# Patient Record
Sex: MALE | Race: WHITE
[De-identification: names, ages, dates, MRNs, and addresses within clinical notes are randomized per-mention and may not be internally consistent; named-entity substitution may affect disease eponyms.]

---

## 2019-09-05 LAB
BUN BLD-MCNC: 35 MG/DL (ref 7–18)
GLUCOSE SERPLBLD-MCNC: 96 MG/DL (ref 74–106)
HCT VFR BLD CALC: 33.7 % (ref 39.6–49)
INR BLD: 1.06
LYMPHOCYTES # SPEC AUTO: 1.2 K/UL (ref 0.7–4.9)
PMV BLD: 7.7 FL (ref 7.6–11.3)
POTASSIUM SERPL-SCNC: 4.8 MMOL/L (ref 3.5–5.1)
RBC # BLD: 3.64 M/UL (ref 4.33–5.43)

## 2019-09-05 NOTE — RAD REPORT
EXAM DESCRIPTION:  RAD - Chest Pa And Lat (2 Views) - 9/5/2019 10:57 am

 

CLINICAL HISTORY:  preop

Chest pain.

 

COMPARISON:  No comparisons

 

FINDINGS:  The lungs are emphysematous but clear. The heart is normal in size. No displaced fractures
.

 

IMPRESSION:  Mild COPD.

## 2019-09-11 ENCOUNTER — HOSPITAL ENCOUNTER (OUTPATIENT)
Dept: HOSPITAL 97 - OR | Age: 64
Discharge: HOME | End: 2019-09-11
Attending: ORTHOPAEDIC SURGERY
Payer: COMMERCIAL

## 2019-09-11 VITALS — SYSTOLIC BLOOD PRESSURE: 105 MMHG | DIASTOLIC BLOOD PRESSURE: 80 MMHG | OXYGEN SATURATION: 94 % | TEMPERATURE: 97 F

## 2019-09-11 DIAGNOSIS — G47.33: ICD-10-CM

## 2019-09-11 DIAGNOSIS — M75.51: ICD-10-CM

## 2019-09-11 DIAGNOSIS — S43.431A: ICD-10-CM

## 2019-09-11 DIAGNOSIS — I10: ICD-10-CM

## 2019-09-11 DIAGNOSIS — M75.21: ICD-10-CM

## 2019-09-11 DIAGNOSIS — Z82.49: ICD-10-CM

## 2019-09-11 DIAGNOSIS — M75.41: ICD-10-CM

## 2019-09-11 DIAGNOSIS — M54.12: ICD-10-CM

## 2019-09-11 DIAGNOSIS — E78.00: ICD-10-CM

## 2019-09-11 DIAGNOSIS — K21.9: ICD-10-CM

## 2019-09-11 DIAGNOSIS — M75.101: Primary | ICD-10-CM

## 2019-09-11 PROCEDURE — 71046 X-RAY EXAM CHEST 2 VIEWS: CPT

## 2019-09-11 PROCEDURE — 85610 PROTHROMBIN TIME: CPT

## 2019-09-11 PROCEDURE — 0RBJ4ZZ EXCISION OF RIGHT SHOULDER JOINT, PERCUTANEOUS ENDOSCOPIC APPROACH: ICD-10-PCS

## 2019-09-11 PROCEDURE — 85025 COMPLETE CBC W/AUTO DIFF WBC: CPT

## 2019-09-11 PROCEDURE — 73020 X-RAY EXAM OF SHOULDER: CPT

## 2019-09-11 PROCEDURE — 36415 COLL VENOUS BLD VENIPUNCTURE: CPT

## 2019-09-11 PROCEDURE — 0LS10ZZ REPOSITION RIGHT SHOULDER TENDON, OPEN APPROACH: ICD-10-PCS

## 2019-09-11 PROCEDURE — 0RHJ04Z INSERTION OF INTERNAL FIXATION DEVICE INTO RIGHT SHOULDER JOINT, OPEN APPROACH: ICD-10-PCS

## 2019-09-11 PROCEDURE — 0LQ14ZZ REPAIR RIGHT SHOULDER TENDON, PERCUTANEOUS ENDOSCOPIC APPROACH: ICD-10-PCS

## 2019-09-11 PROCEDURE — 85730 THROMBOPLASTIN TIME PARTIAL: CPT

## 2019-09-11 PROCEDURE — 80048 BASIC METABOLIC PNL TOTAL CA: CPT

## 2019-09-11 RX ADMIN — HYDROMORPHONE HYDROCHLORIDE ONE MG: 2 INJECTION INTRAMUSCULAR; INTRAVENOUS; SUBCUTANEOUS at 10:22

## 2019-09-11 RX ADMIN — HYDROMORPHONE HYDROCHLORIDE ONE MG: 2 INJECTION INTRAMUSCULAR; INTRAVENOUS; SUBCUTANEOUS at 10:06

## 2019-09-11 NOTE — XMS REPORT
Summary of Care

 Created on:2019



Patient:Shekhar Griffin

Sex:Male

:1955

External Reference #:[id number]





Demographics







 Address  [address] [place] [address]

 

 Mobile Phone  [phone]

 

 Home Phone  [phone]

 

 Phone  [phone]

 

 Home Phone  [phone]

 

 Email Address  [email or web address]

 

 Preferred Language  English

 

 Marital Status  

 

 Anglican Affiliation  Unknown

 

 Race  White

 

 Ethnic Group  Not  or 









Author







 Organization  [facility]

 

 Address  [address] [place] [address]









Support







 Name  Relationship  Address  Phone

 

 Gaby Mehta  Unavailable  [address] [facility]  [phone]



     [place] [address]  









Care Team Providers







 Name  Role  Phone

 

 Clark Daley  Primary Care Provider  Unavailable









Reason for Referral

 (Routine)





 Status  Reason  Specialty  Diagnoses /  Referred By  Referred To



       Procedures  Contact  Contact

 

 New Request    Sleep Disorder  Diagnoses



Obstructive sleep apnea of adult  [facility],  



     Diagnostic  



Procedures



SLEEP STUDY, ATTENDED  [address]  



         [address]



  



         [place] [address]



  



         Phone:  



         [phone]



  



         Fax:  



         [phone]  









Reason for Visit







 Reason  Comments

 

 New Patient  

 

 Results  HST



 (Routine)





 Status  Reason  Specialty  Diagnoses /  Referred By  Referred To



       Procedures  Contact  Contact

 

 Closed    Sleep Disorder  Diagnoses



STEVEN (obstructive sleep apnea)  Bobbi Orr MD



  



     Diagnostic  



Procedures



CONSULT/REFERRAL SLEEP CLINIC ADULT PULM Preferred Location: [address]



  



         [address]



  



         [place]  



         [address]



  



         Phone:  



         [phone]



  



         Fax: [phone]  









Encounter Details







 Date  Type  Department  Care Team  Description

 

 2019  Office Visit  [facility]  Bekah Dennis 



  Obstructive sleep



     Pulmonary Clinic



  [address] [place] [address] 



  apnea of [address]



  (Primary Dx)



     [address]



  [place] [address]



  



     [place]  [phone]



  



     [phone] (Fax)  



     [phone]    







Allergies

No Known Allergiesdocumented as of this encounter (statuses as of 2019)



Medications







 Medication  Sig  Dispensed  Refills  Start Date  End Date  Status

 

 folic  Take  by mouth    0      Active



 acid/multivit-min/lutein  daily.          



 (CENTRUM SILVER ORAL)            

 

 acetaminophen/diphenhydr  Take  by mouth    0      Active



 amine (TYLENOL PM ORAL)  as needed.          

 

 Bifidobacterium infantis  Take  by mouth    0      Active



 (ALIGN ORAL)  daily.          

 

 vit A/vit C/vit  Take  by mouth    0      Active



 E/zinc/copper  daily.          



 (PRESERVISION AREDS            



 ORAL)            

 

 simvastatin 40 mg tablet  Take 40 mg by    0      Active



   mouth at          



   bedtime.          

 

 sucralfate 1 gram tablet  Take 3 g by    0      Active



   mouth before          



   meals and at          



   bedtime.          

 

 BENZONATATE ORAL  Take  by mouth    0      Active



   3 (three) times          



   daily.          

 

 triamterene-hydrochlorot  Take 1 capsule  30 capsule  2  2019    Active



 hiazide (DYAZIDE)  by mouth every          



 37.5-25 mg per capsule  morning.          

 

 amLODIPine 10 mg tablet  Take 1 tablet    0  2019    Active



   by mouth every          



   morning.          

 

 lisinopril 40 mg  Take 1 tablet  30 tablet  2  2019    Active



 tabletIndications:  by mouth every          



 Essential hypertension  morning.          



documented as of this encounter (statuses as of 2019)



Active Problems

Not on filedocumented as of this encounter (statuses as of 2019)



Social History







 Tobacco Use  Types  Packs/Day  Years Used  Date

 

 Former Smoker        









 Smokeless Tobacco: Former User      









 Alcohol Use  Drinks/Week  oz/Week  Comments

 

 Yes      social









 Sex Assigned at Birth  Date Recorded

 

 Not on file  









 Job Start Date  Occupation  Industry

 

 Not on file  Not on file  Not on file









 Travel History  Travel Start  Travel End









 No recent travel history available.



documented as of this encounter



Last Filed Vital Signs







 Vital Sign  Reading  Time Taken  Comments

 

 Blood Pressure  117/76  2019 11:26 AM CDT  

 

 Pulse  90  2019 11:26 AM CDT  

 

 Temperature  -  -  

 

 Respiratory Rate  19  2019 11:26 AM CDT  

 

 Oxygen Saturation  97%  2019 11:26 AM CDT  

 

 Inhaled Oxygen Concentration  -  -  

 

 Weight  107 kg (235 lb 12.8 oz)  2019 11:26 AM CDT  

 

 Height  193 cm (6' 4")  2019 11:26 AM CDT  

 

 Body Mass Index  28.7  2019 11:26 AM CDT  



documented in this encounter



Progress Notes

Bekah Dennis - 2019 11:30 AM CDTReason for Clinic Visit: The 
patient comes to clinic today for a review of sleep study results.



Chief Complaints: The patient reports: excessive daytime sleepiness, fatigue, 
snoring, witnessed apneas.



History of Present Illness: The usual bed time is 9 p.m. and wake up time is 5:
30-6 a.m. The patientdoes take intentional naps during the day. The patient 
does not suffer from irresistible sleep attacks during the day. The patient 
does not experience sudden loss of muscle tone when emotional or excited. The 
patient does not report vivid dream-like images and loss of muscle tone when 
falling asleep and upon awakening.



Anderson Sleepiness Scale score: 10 (0-24).



Social History:  The patient does not smoke cigarettes. The patient 
occasionally drinks alcoholic beverages. Caffeinated beverages consumption: 1-2 
per day.



Family History: The family history is positive for snoring in blood relatives.



Physical Examination: 1) Vital signs: as noted above. 2) General: the patient 
is pleasant,  well developed, and in NAD. 3) Skin: there are no rashes, edema, 
or abnormal pigmentation. 4) Head: there areno skull deformities or 
pathological facial asymmetry. 5) Eyes: pupils are equal, round, and reactive 
to light; extraocular movements are conjugate and unrestricted, without 
strabismus or nystagmus. 6)ENT: oropharynx reveals low set soft palate and 
elongated uvula; the patient displays a good sniff through both the right and 
left nostril. 7) Neck: there are no distended veins or enlarged lymph nodes. 8) 
Back: straight, spine - without pathological curvatures. 9) Bilateral lower 
extremities are without edema. 10) Neurological - patient is alert, oriented 
and answers questions appropriately.



Review of Systems:

 1)Respiratory: positive for snoring and witnessed apneas; 2)Cardiovascular: 
positive for hypertension; 3)Endocrine/Metabolic: positive for dyslipidemia; 4)
Digestive: negative for abnormalities; 5)Urinary: positive for nocturia; 6)
Skeletal: no skeletal abnormalities detected; 7)Muscular: negative for muscular 
abnormalities; 8)Nervous: positive for hypersomnia; 9)Integumentary: no visible 
or reported skin or hair abnormalities; 10)Reproductive: no reproductive 
abnormalities noted; 11)Immune/Lymphatic/Allergy: positive for respiratory 
allergies.



Sleep Study Results: The HST on 19 revealed respiratory disturbance index 
of 37.8 events per hour of total sleep (normal &lt;5/hr) with a minimum oxygen 
saturation by pulse oximetry of 84%.



Diagnosis: Obstructive Sleep Apnea-G47.33



Recommendations and Patient Education:

The medical condition (Obstructive Sleep Apnea Syndrome) was discussed with the 
patient and the possible consequences of untreated sleep apnea regarding 
quality of sleep, daytime alertness, and cardiovascular complications were 
underlined.

The patient was prompted to ask questions clarifying the subject of sleep apnea 
and related issues and all questions were answered in detail.

Different treatment options were discussed with the patient including Positive 
Airway Pressure (PAP), ENT surgery, Mandibular Advancement Device, and weight 
reduction.

The effectiveness, success rates, and side effects of these different treatment 
options were compared and analyzed.

The patient prefers PAP treatment trial at this time and I have therefore 
scheduled for a PAP titration study.

Additional time was spent discussing sleep hygiene including: regular bedtime 
and wake-up times; enough sleep hours; going to bed only when sleepy; using bed 
for the sole purpose of sleeping; avoidanceof: 1) caffeinated and alcoholic 
beverages, 2) strenuous cognitive activity, or 3) heavy meals in the evening.

The patient was instructed to contact us in case of any further questions or 
concerns.



I will follow up with the patient to review the results of the sleep study.

Electronically signed by Bekah Dennis at 2019 11:51 AM 
CDTdocumented in this encounter



Plan of Treatment







 Date  Type  Specialty  Care Team  Description

 

 2019  Office Visit  Cardiology  Bobbi Orr MD



  



       [address] [place] [address]



  



       [phone] (Fax)  

 

 2019  Technician Visit  Sleep Disorder  Bekah Dennis



[address] [place] [address] [address] [place] [address]



[phone] (Fax)  



     Diagnostic  



1, [facility] Sleep Lab Bed  

 

 2019  Office Visit  Pulmonary Disease  Bekah Dennis



  



       [address] [place] [address] [address] [place] [address]



  



       [phone] (Fax)  









 Name  Type  Priority  Associated Diagnoses  Order Schedule

 

 SLEEP STUDY, ATTENDED  PROCEDURES  Routine  Obstructive sleep apnea  Ordered: 
2019



       of adult  









 Health Maintenance  Due Date  Last Done  Comments

 

 HEPATITIS C (HCV) SCREEN  1955    

 

 DTaP,Tdap,and Td Vaccines (1 -  1975    



 Tdap)      

 

 COLONOSCOPY  2006    

 

 Zoster Recombinant Vaccine  2006    



 (SHINGRIX) (1 of 2)      

 

 LUNG CANCER SCREEN: Recommended  2011    



 for age 55-80 with 30 + pack year      



 history      

 

 INFLUENZA VACCINE  2019    

 

 PNEUMOCOCCAL 0-64 YEARS COMBINED  Aged Out    No longer eligible based on



 SERIES      patient's age to complete this



       topic



documented as of this encounter



Results

Not on filedocumented in this encounter



Visit Diagnoses







 Diagnosis

 

 Obstructive sleep apnea of adult - Primary







 Obstructive sleep apnea (adult) (pediatric)



documented in this encounter



Insurance







 Payer  Benefit Plan  Subscriber ID  Effective Dates  Phone  Address  Type



   / Group          

 

 BCBS [facility]  [id number]  2019-Radha  [phone]  [address]  
[address]



 [place]      t  [address]  [id number]



  



           [place]  



           [address]  









 Guarantor Name  Account Type  Relation to  Date of  Phone  Billing



     Patient  Birth    Address

 

 RodrickShekhar mclaughlin  Personal/Family  Self  1955  [phone]  [address]



         (Home)



  [place]







         [phone]  [facility]



         (Work)  [place] [address]



documented as of this encounter

## 2019-09-11 NOTE — XMS REPORT
Patient Summary Document

 Created on:2019



Patient:ISAURA POP

Sex:Male

:1955

External Reference #:[id number]





Demographics







 Address  [address] [place] [address]

 

 Home Phone  [phone]

 

 Work Phone  [phone]

 

 Email Address  [email or web address]

 

 Preferred Language  Unknown

 

 Marital Status  Unknown

 

 Holiness Affiliation  Unknown

 

 Race  Unknown

 

 Additional Race(s)  Unavailable

 

 Ethnic Group  Unknown









Author







 Organization  [facility]

 

 Address  [address] [place] [address] Dr. Pillai [address] [place] [address]

 

 Phone  [phone]









Care Team Providers







 Name  Role  Phone

 

 Unavailable  Unavailable  Unavailable









Problems

This patient has no known problems.



Allergies, Adverse Reactions, Alerts

This patient has no known allergies or adverse reactions.



Medications

This patient has no known medications.

## 2019-09-11 NOTE — XMS REPORT
Summary of Care

 Created on:2019



Patient:Shekhar Griffin

Sex:Male

:1955

External Reference #:[id number]





Demographics







 Address  [address] [place] [address]

 

 Mobile Phone  [phone]

 

 Home Phone  [phone]

 

 Phone  [phone]

 

 Home Phone  [phone]

 

 Email Address  [email or web address]

 

 Preferred Language  English

 

 Marital Status  

 

 Taoism Affiliation  Unknown

 

 Race  White

 

 Ethnic Group  Not  or 









Author







 Organization  [facility]

 

 Address  [address] [place] [address]









Support







 Name  Relationship  Address  Phone

 

 Gaby Mehta  Unavailable  [address] [facility]  [phone]



     [place] [address]  









Care Team Providers







 Name  Role  Phone

 

 ThuyClark barr  Primary Care Provider  Unavailable









Reason for Visit







 Reason  Comments

 

 Follow-up  3mo

 

 Pre-op Clearance  Rotator Cuff Sx







Encounter Details







 Date  Type  Department  Care Team  Description

 

 2019  Office Visit  [facility]  Bobbi Orr MD



  Chest pain, unspecified type (Primary Dx);



     Cardiology- [address]  Preop cardiovascular exam;



     [address] [place] [address]



  STEVEN (obstructive sleep apnea)



     [address]



  [address]



  



     [place] [address]



  



     [phone] (Fax)  







Allergies

No Known Allergiesdocumented as of this encounter (statuses as of 2019)



Medications







 Medication  Sig  Dispensed  Refills  Start Date  End Date  Status

 

 vit A/vit C/vit  Take  by    0      Active



 E/zinc/copper  mouth daily.          



 (PRESERVISION AREDS            



 ORAL)            

 

 simvastatin 40 mg  Take 40 mg by    0      Active



 tablet  mouth at          



   bedtime.          

 

 triamterene-hydrochlo  Take 1  30 capsule  2  2019    Active



 rothiazide (DYAZIDE)  capsule by          



 37.5-25 mg per  mouth every          



 capsule  morning.          

 

 amLODIPine 10 mg  Take 1 tablet    0  2019    Active



 tablet  by mouth          



   every          



   morning.          

 

 lisinopril 40 mg  Take 1 tablet  30 tablet  2  2019    Active



 tabletIndications:  by mouth          



 Essential  every          



 hypertension  morning.          

 

 folic  Take  by    0      Discontinued



 acid/multivit-min/lut  mouth daily.        9  



 ein (CENTRUM SILVER            



 ORAL)            

 

 acetaminophen/diphenh  Take  by    0      Discontinued



 ydramine (TYLENOL PM  mouth as        9  



 ORAL)  needed.          

 

 Bifidobacterium  Take  by    0      Discontinued



 infantis (ALIGN ORAL)  mouth daily.        9  

 

 sucralfate 1 gram  Take 3 g by    0      Discontinued



 tablet  mouth before        9  



   meals and at          



   bedtime.          

 

 BENZONATATE ORAL  Take  by    0      Discontinued



   mouth 3        9  



   (three) times          



   daily.          



documented as of this encounter (statuses as of 2019)



Active Problems

Not on filedocumented as of this encounter (statuses as of 2019)



Social History







 Tobacco Use  Types  Packs/Day  Years Used  Date

 

 Former Smoker        









 Smokeless Tobacco: Former User      









 Alcohol Use  Drinks/Week  oz/Week  Comments

 

 Yes      social









 Sex Assigned at Birth  Date Recorded

 

 Not on file  









 Job Start Date  Occupation  Industry

 

 Not on file  Not on file  Not on file









 Travel History  Travel Start  Travel End









 No recent travel history available.



documented as of this encounter



Last Filed Vital Signs







 Vital Sign  Reading  Time Taken  Comments

 

 Blood Pressure  108/72  2019 10:33 AM  



     CDT  

 

 Pulse  83  2019 10:33 AM  



     CDT  

 

 Temperature  -  -  

 

 Respiratory Rate  20  2019 10:33 AM  



     CDT  

 

 Oxygen Saturation  97%  2019 10:33 AM  



     CDT  

 

 Inhaled Oxygen Concentration  -  -  

 

 Weight  106.9 kg (235 lb 9.6 oz)  2019 10:33 AM  



     CDT  

 

 Height  190.5 cm (6' 3")  2019 10:33 AM  



     CDT  

 

 Body Mass Index  29.45  2019 10:33 AM  



     CDT  



documented in this encounter



Progress Notes

Bobbi Orr MD - 2019 10:20 AM CDTFormatting of this note might be 
different from the original.

CARDIOLOGY CLINIC NOTE

2019

Reason for Referral/Presenting Complaint: chest pain



PCP: Clark Daley



History of Present Illness:

Shekhar Griffin is a 63 years old male with history of HTN, HLD. He is here 
for chest pain.

It is precordial exploding sensation, associated with SOB. It only occurs when 
he is exerting and burps at the same time. Feels that he cannot get the gas 
out. Sometimes he wakes up burping. Occasionalleft sided arm pain. Has a slimy 
taste with nausea. Barium test normal. Underwent GI endoscopy. BP has been 
high. He snores.

Going to have rotator cuff repair.

Stated that when working outside in hot whether, he gets abdominal pressure, 
then he feels that he cannot burp fast enough to get rid of the gas. It 
accumulates and pushes the chest pain. If he gets inside and drinks something 
cool he will then burp which will relieve the chest pressure.



Review of Systems:

General: (-) fever, (-) chills, (-) weight change, (-) dizziness, (-) fatigue

Skin: (-) rash

HEENT: (-) headache, (-) change in vision

Neck: (-) difficulty swallowing

Heme: negative

Resp: (-) cough, (-) dyspnea on exertion

Cardio: (+) chest pain, (-) palpitations, (-) syncope

GI: (-) vomiting, (-) diarrhea

: negative

Endo: (-) diabetes, (-) thyroid disease

Neuro: (-) numbness, (-) tingling, (-) weakness

Back: (-) pain

ZANDER: (-) muscle pain, (-) claudication

Psych: (-) anxiety, (-) depression



Past Medical History:

No past medical history on file.

Current Medications:

Current Outpatient Medications

Medication Sig Dispense Refill

 lisinopril 40 mg tablet Take 1 tablet by mouth every morning. 30 tablet 2

 amLODIPine 10 mg tablet Take 1 tablet by mouth every morning.

 triamterene-hydrochlorothiazide (DYAZIDE) 37.5-25 mg per capsule Take 1 
capsule by mouth every morning. 30 capsule 2

 simvastatin 40 mg tablet Take 40 mg by mouth at bedtime.

 vit A/vit C/vit E/zinc/copper (PRESERVISION AREDS ORAL) Take  by mouth 
daily.



No current facility-administered medications for this visit.



Social History:

Social History



Socioeconomic History

 Marital status: 

  Spouse name: Not on file

 Number of children: Not on file

 Years of education: Not on file

 Highest education level: Not on file

Occupational History

 Not on file

Social Needs

 Financial resource strain: Not on file

 Food insecurity:

  Worry: Not on file

  Inability: Not on file

 Transportation needs:

  Medical: Not on file

  Non-medical: Not on file

Tobacco Use

 Smoking status: Former Smoker

 Smokeless tobacco: Former User

Substance and Sexual Activity

 Alcohol use: Yes

  Comment: social

 Drug use: No

 Sexual activity: Not on file

Lifestyle

 Physical activity:

  Days per week: Not on file

  Minutes per session: Not on file

 Stress: Not on file

Relationships

 Social connections:

  Talks on phone: Not on file

  Gets together: Not on file

  Attends Protestant service: Not on file

  Active member of club or organization: Not on file

  Attends meetings of clubs or organizations: Not on file

  Relationship status: Not on file

 Intimate partner violence:

  Fear of current or ex partner: Not on file

  Emotionally abused: Not on file

  Physically abused: Not on file

  Forced sexual activity: Not on file

Other Topics Concern

 Not on file

Social History Narrative

 Not on file



Family History

Family History

Problem Relation Age of Onset

 CHF (congestive heart failure) Father

 MI (myocardial infarction) Father 68

      of MI

 Heart Mother

     pacemaker



Physical Examination:

/72 (BP Location: Left arm, Patient Position: Sitting, BP CUFF SIZE: 
Adult Large)  | Pulse 83| Resp 20  | Ht 6' 3" (1.905 m)  | Wt 235 lb 9.6 oz (
106.9 kg)  | SpO2 97%  | BMI 29.45 kg/m

Constitutional: alert and oriented x 3 (person, place and date/time); no 
apparent distress

ENT: normocephalic atraumatic, supple, no lymphadenopathy, no bruits, no JVD

Lungs: clear to auscultation bilaterally

Cardiovascular: S1, S2 normal, regular; no murmurs, rubs or gallops

GI: soft; non-tender; non-distended; normoactive bowel sounds

: not examined

Musculoskeletal: Extremities: no clubbing, cyanosis, or edema

Skin: no rashes

Neuro: no focal deficits



Cardiovascular testing:

EKG: Sinus bradycardia, HR 54 bpm, minimal LVH



Assessment/Plan:

  ICD-10-CM ICD-9-CM

1. Chest pain, unspecified type R07.9 786.50

2. Preop cardiovascular exam Z01.810 V72.81



Chest pain--Mostly dyspepsia. Nuclear stress test showed no major concerns, 
possibly some artifact without clearcut ischemia. However we are open to 
getting a definitive diagnosis with [facility] if necessary. Low to intermediate 
cardiac risk for rotator cuff repair.

HTN--Will continue amlodipine and lisinopril. Low salt diet. Daily BP log. BP 
control is good now.

STEVEN--severe STEVEN. Referred to sleep clinic.

Patient was counseled for lifestyle modifications including: diet, exercise and 
weight loss



Bobbi Orr MD, FACC, L[place]NO[place]

, Division of Cardiology

[facility]



Electronically signed by Bobbi Orr MD at 2019 11:43 AM CDTdocumented 
in this encounter



Plan of Treatment







 Date  Type  Specialty  Care Team  Description

 

 2019  Office Visit  Pulmonary Disease  Bekah Dennis T



  



       [address] [place] [address] [address]



  



       [address]



  



       [place], [place] [address] [phone] (Fax)  

 

 2020  Office Visit  Cardiology  Bobbi Orr MD



  



       [address] [facility]



  



       [address]



  



       [place] [address] [phone] (Fax)  









 Health Maintenance  Due Date  Last Done  Comments

 

 HEPATITIS C (HCV) SCREEN  1956    

 

 DTaP,Tdap,and Td Vaccines (1 -  1975    



 Tdap)      

 

 COLONOSCOPY  2006    

 

 Zoster Recombinant Vaccine  2006    



 (SHINGRIX) (1 of 2)      

 

 LUNG CANCER SCREEN: Recommended  2011    



 for age 55-80 with 30 + pack year      



 history      

 

 INFLUENZA VACCINE (#1)  2019    

 

 PNEUMOCOCCAL 0-64 YEARS COMBINED  Aged Out    No longer eligible based on



 SERIES      patient's age to complete this



       topic



documented as of this encounter



Results

Not on filedocumented in this encounter



Visit Diagnoses







 Diagnosis

 

 Chest pain, unspecified type - Primary

 

 Preop cardiovascular exam







 Pre-operative cardiovascular examination

 

 STEVEN (obstructive sleep apnea)







 Obstructive sleep apnea (adult) (pediatric)



documented in this encounter



Insurance







 Payer  Benefit Plan  Subscriber ID  Effective Dates  Phone  Address  Type



   / Group          

 

 [facility]  [id number]  2019-Radha  [phone]  [address]  
[address]



 [place]      t  [address]  [id number]



  



           [place], [place]  



           [address]  









 Guarantor Name  Account Type  Relation to  Date of  Phone  Billing



     Patient  Birth    Address

 

 Shekhar Griffin  Personal/Family  Self  1955  [phone]  [address]



         (Home)



  [place]







         [phone]  [place],



         ([place])  [place] [address]



documented as of this encounter

## 2019-09-11 NOTE — XMS REPORT
Summary of Care

 Created on:2019



Patient:Shekhar Griffin

Sex:Male

:1955

External Reference #:[id number]





Demographics







 Address  [address] [place] [address]

 

 Mobile Phone  [phone]

 

 Home Phone  [phone]

 

 Phone  [phone]

 

 Home Phone  [phone]

 

 Email Address  [email or web address]

 

 Preferred Language  English

 

 Marital Status  

 

 Scientologist Affiliation  Unknown

 

 Race  White

 

 Ethnic Group  Not  or 









Author







 Organization  [facility]

 

 Address  [address] [place] [address]









Support







 Name  Relationship  Address  Phone

 

 Gaby Mehta  Unavailable  [address] [facility]  [phone]



     [place] [address]  









Care Team Providers







 Name  Role  Phone

 

 Clark Daley  Primary Care Provider  Unavailable









Encounter Details







 Date  Type  Department  Care Team  Description

 

 2019  Orders Only  [facility]



  Doctor Unassigned, No  



     [address] [facility]



  Name



  



     [place] [address]  [address] [place] [address]  







Allergies

No Known Allergiesdocumented as of this encounter (statuses as of 2019)



Medications







 Medication  Sig  Dispensed  Refills  Start Date  End Date  Status

 

 vit A/vit C/vit  Take  by mouth    0      Active



 E/zinc/copper  daily.          



 (PRESERVISION AREDS            



 ORAL)            

 

 simvastatin 40 mg  Take 40 mg by    0      Active



 tablet  mouth at bedtime.          

 

 triamterene-hydrochlor  Take 1 capsule by  30 capsule  2  2019    Active



 othiazide (DYAZIDE)  mouth every          



 37.5-25 mg per capsule  morning.          

 

 amLODIPine 10 mg  Take 1 tablet by    0  2019    Active



 tablet  mouth every          



   morning.          

 

 lisinopril 40 mg  Take 1 tablet by  30 tablet  2  2019    Active



 tabletIndications:  mouth every          



 Essential hypertension  morning.          



documented as of this encounter (statuses as of 2019)



Active Problems

Not on filedocumented as of this encounter (statuses as of 2019)



Social History







 Tobacco Use  Types  Packs/Day  Years Used  Date

 

 Former Smoker        









 Smokeless Tobacco: Former User      









 Alcohol Use  Drinks/Week  oz/Week  Comments

 

 Yes      social









 Sex Assigned at Birth  Date Recorded

 

 Not on file  









 Job Start Date  Occupation  Industry

 

 Not on file  Not on file  Not on file









 Travel History  Travel Start  Travel End









 No recent travel history available.



documented as of this encounter



Last Filed Vital Signs

Not on filedocumented in this encounter



Plan of Treatment







 Date  Type  Specialty  Care Team  Description

 

 2019  Office Visit  Pulmonary Disease  Bekah Dennis



  



       [address] [place] [address] Dr



  



       [address]



  



       [place] [address]



  



       [phone] (Fax)  

 

 2020  Office Visit  Cardiology  Bobbi Orr MD



  



       [address] [place] [address]



  



       [address]



  



       [place], [place] [address] [phone] (Fax)  









 Health Maintenance  Due Date  Last Done  Comments

 

 HEPATITIS C (HCV) SCREEN  1955    

 

 DTaP,Tdap,and Td Vaccines (1 -  1975    



 Tdap)      

 

 COLONOSCOPY  2006    

 

 Zoster Recombinant Vaccine  2006    



 (SHINGRIX) (1 of 2)      

 

 LUNG CANCER SCREEN: Recommended  2011    



 for age 55-80 with 30 + pack year      



 history      

 

 INFLUENZA VACCINE (#1)  2019    

 

 PNEUMOCOCCAL 0-64 YEARS COMBINED  Aged Out    No longer eligible based on



 SERIES      patient's age to complete this



       topic



documented as of this encounter



Procedures







 Procedure Name  Priority  Date/Time  Associated Diagnosis  Comments

 

 MEDICAL  Routine  2019 12:01 AM CDT    



 RELEASE/CLEARANCE FORMS        



documented in this encounter



Results

Not on filedocumented in this encounter



Insurance







 Payer  Benefit Plan  Subscriber ID  Effective Dates  Phone  Address  Type



   / Group          

 

 BCBS [facility]  [id number]  2019-Radha  [phone]  [address]  
PPO/POS



 [place]      t  [address]  [id number]



  



           [place], [place]  



           [address]  



documented as of this encounter

## 2019-09-11 NOTE — XMS REPORT
Summary of Care

 Created on:2019



Patient:Shekhar Griffin

Sex:Male

:1955

External Reference #:[id number]





Demographics







 Address  [address] [place] [address]

 

 Mobile Phone  [phone]

 

 Home Phone  [phone]

 

 Phone  [phone]

 

 Home Phone  [phone]

 

 Email Address  [email or web address]

 

 Preferred Language  English

 

 Marital Status  

 

 Oriental orthodox Affiliation  Unknown

 

 Race  White

 

 Ethnic Group  Not  or 









Author







 Organization  [facility]

 

 Address  [address] [place] [address]









Support







 Name  Relationship  Address  Phone

 

 Gaby Mehta  Unavailable  [address] [phone]



     [place] [address]  









Care Team Providers







 Name  Role  Phone

 

 Clark Daley  Primary Care Provider  Unavailable









Encounter Details







 Date  Type  Department  Care Team  Description

 

 2019  Orders Only  [facility]



  Doctor Unassigned, No  



     [address] [facility]



  Name



  



     [place] [address]  [address] [place] [address]  







Allergies

No Known Allergiesdocumented as of this encounter (statuses as of 2019)



Medications







 Medication  Sig  Dispensed  Refills  Start Date  End Date  Status

 

 vit A/vit C/vit  Take  by mouth    0      Active



 E/zinc/copper  daily.          



 (PRESERVISION AREDS            



 ORAL)            

 

 simvastatin 40 mg  Take 40 mg by    0      Active



 tablet  mouth at bedtime.          

 

 triamterene-hydrochlor  Take 1 capsule by  30 capsule  2  2019    Active



 othiazide (DYAZIDE)  mouth every          



 37.5-25 mg per capsule  morning.          

 

 amLODIPine 10 mg  Take 1 tablet by    0  2019    Active



 tablet  mouth every          



   morning.          

 

 lisinopril 40 mg  Take 1 tablet by  30 tablet  2  2019    Active



 tabletIndications:  mouth every          



 Essential hypertension  morning.          



documented as of this encounter (statuses as of 2019)



Active Problems

Not on filedocumented as of this encounter (statuses as of 2019)



Social History







 Tobacco Use  Types  Packs/Day  Years Used  Date

 

 Former Smoker        









 Smokeless Tobacco: Former User      









 Alcohol Use  Drinks/Week  oz/Week  Comments

 

 Yes      social









 Sex Assigned at Birth  Date Recorded

 

 Not on file  









 Job Start Date  Occupation  Industry

 

 Not on file  Not on file  Not on file









 Travel History  Travel Start  Travel End









 No recent travel history available.



documented as of this encounter



Last Filed Vital Signs

Not on filedocumented in this encounter



Plan of Treatment







 Date  Type  Specialty  Care Team  Description

 

 2019  Office Visit  Pulmonary Disease  Bekah Dennis



  



       [address] [place] [address] Dr



  



       [address]



  



       [place] [address]



  



       [phone] (Fax)  

 

 2020  Office Visit  Cardiology  Bobbi Orr MD



  



       [address] [place] [address]



  



       [address]



  



       [place], [place] [address] [phone] (Fax)  









 Health Maintenance  Due Date  Last Done  Comments

 

 HEPATITIS C (HCV) SCREEN  1955    

 

 DTaP,Tdap,and Td Vaccines (1 -  1975    



 Tdap)      

 

 COLONOSCOPY  2006    

 

 Zoster Recombinant Vaccine  2006    



 (SHINGRIX) (1 of 2)      

 

 LUNG CANCER SCREEN: Recommended  2011    



 for age 55-80 with 30 + pack year      



 history      

 

 INFLUENZA VACCINE (#1)  2019    

 

 PNEUMOCOCCAL 0-64 YEARS COMBINED  Aged Out    No longer eligible based on



 SERIES      patient's age to complete this



       topic



documented as of this encounter



Procedures







 Procedure Name  Priority  Date/Time  Associated Diagnosis  Comments

 

 SLEEP STUDY DATA REPORT  Routine  2019 12:01 AM CDT    



documented in this encounter



Results

Not on filedocumented in this encounter



Insurance







 Payer  Benefit Plan  Subscriber ID  Effective Dates  Phone  Address  Type



   / Group          

 

 BCBS OF  [facility]  [id number]  2019-Radha  [phone]  [address]  
PPO/POS



 [place]      t  7  [id number]



  



           [place], [place]  



           [address]  



documented as of this encounter

## 2019-09-11 NOTE — XMS REPORT
Summary of Care

 Created on:2019



Patient:Shekhar Griffin

Sex:Male

:1955

External Reference #:[id number]





Demographics







 Address  [address] [place] [address]

 

 Mobile Phone  [phone]

 

 Home Phone  [phone]

 

 Phone  [phone]

 

 Home Phone  [phone]

 

 Email Address  [email or web address]

 

 Preferred Language  English

 

 Marital Status  

 

 Cheondoism Affiliation  Unknown

 

 Race  White

 

 Ethnic Group  Not  or 









Author







 Organization  [facility]

 

 Address  [address] [place] [address]









Support







 Name  Relationship  Address  Phone

 

 Gaby Mehta  Unavailable  [address] [phone]



     [place] [address]  









Care Team Providers







 Name  Role  Phone

 

 Thuy Clark  Primary Care Provider  Unavailable









Reason for Visit







 Reason  Comments

 

 APNEA  



 (Routine)





 Status  Reason  Specialty  Diagnoses /  Referred By  Referred To



       Procedures  Contact  Contact

 

 Closed    Sleep Disorder  Diagnoses



Obstructive sleep apnea of adult  Bekah Dennis  



     Diagnostic  



Procedures



SLEEP STUDY, ATTENDED  [address]



  



         [place]  



         [address]



  



         Phone:  



         [phone]



  



         Fax: [phone]  









Encounter Details







 Date  Type  Department  Care Team  Description

 

 2019  Technician Visit  [facility]  [facility] [address]



[place] [address] [phone] (Fax)  Apnea



     Disorder Center-  



, [facility] Sleep Lab Bed  



     [address] [place] [address] [phone]    







Allergies

No Known Allergiesdocumented as of this encounter (statuses as of 2019)



Medications







 Medication  Sig  Dispensed  Refills  Start Date  End Date  Status

 

 vit A/vit C/vit  Take  by mouth    0      Active



 E/zinc/copper  daily.          



 (PRESERVISION AREDS            



 ORAL)            

 

 simvastatin 40 mg  Take 40 mg by    0      Active



 tablet  mouth at bedtime.          

 

 triamterene-hydrochlor  Take 1 capsule by  30 capsule  2  2019    Active



 othiazide (DYAZIDE)  mouth every          



 37.5-25 mg per capsule  morning.          

 

 amLODIPine 10 mg  Take 1 tablet by    0  2019    Active



 tablet  mouth every          



   morning.          

 

 lisinopril 40 mg  Take 1 tablet by  30 tablet  2  2019    Active



 tabletIndications:  mouth every          



 Essential hypertension  morning.          



documented as of this encounter (statuses as of 2019)



Active Problems

Not on filedocumented as of this encounter (statuses as of 2019)



Social History







 Tobacco Use  Types  Packs/Day  Years Used  Date

 

 Former Smoker        









 Smokeless Tobacco: Former User      









 Alcohol Use  Drinks/Week  oz/Week  Comments

 

 Yes      social









 Sex Assigned at Birth  Date Recorded

 

 Not on file  









 Job Start Date  Occupation  Industry

 

 Not on file  Not on file  Not on file









 Travel History  Travel Start  Travel End









 No recent travel history available.



documented as of this encounter



Last Filed Vital Signs

Not on filedocumented in this encounter



Plan of Treatment







 Date  Type  Specialty  Care Team  Description

 

 2019  Office Visit  Pulmonary Disease  Bekah Dennis [address]



  



       [address]



  



       [place], [place] [address] [phone] (Fax)  

 

 2020  Office Visit  Cardiology  Bobbi Orr MD



  



       [address] [place] [address]



  



       [address]



  



       [place], [place] [address] [phone] (Fax)  









 Health Maintenance  Due Date  Last Done  Comments

 

 HEPATITIS C (HCV) SCREEN  1955    

 

 DTaP,Tdap,and Td Vaccines (1 -  1975    



 Tdap)      

 

 COLONOSCOPY  2006    

 

 Zoster Recombinant Vaccine  2006    



 (SHINGRIX) (1 of 2)      

 

 LUNG CANCER SCREEN: Recommended  2011    



 for age 55-80 with 30 + pack year      



 history      

 

 INFLUENZA VACCINE (#1)  2019    

 

 PNEUMOCOCCAL 0-64 YEARS COMBINED  Aged Out    No longer eligible based on



 SERIES      patient's age to complete this



       topic



documented as of this encounter



Results

Not on filedocumented in this encounter



Visit Diagnoses







 Diagnosis

 

 Apnea



documented in this encounter



Insurance







 Payer  Benefit Plan  Subscriber ID  Effective Dates  Phone  Address  Type



   / Group          

 

 BCBS OF  [facility]  [id number]  2019-Radha  [phone]  [address]  
PPO/POS



 [place]      t  7  [id number]



  



           [place], [place]  



           [address]  









 Guarantor Name  Account Type  Relation to  Date of  Phone  Billing



     Patient  Birth    Address

 

 Shekhar Griffin  Personal/Family  Self  1955  [phone]  [address]



         (Home)



  [address]







         [phone]  [place],



         ([place])  [place] [address]



documented as of this encounter

## 2019-09-11 NOTE — XMS REPORT
Continuity of Care Document

 Created on:2014



Patient:ISAURA POP

Sex:Male

:1955

External Reference #:[id number]





Demographics







 Address  [address] [place] [address]

 

 Phone  [phone]

 

 Preferred Language  Unknown

 

 Marital Status  

 

 Cheondoism Affiliation  Unknown

 

 Race  Unknown

 

 Ethnic Group  Unknown









Author







 Organization  [facility]









Care Team Providers







 Name  Role  Phone

 

 MD Leonid, Gumaro  Unavailable  Unavailable









Insurance Providers







 Payer name  Policy type / Coverage  Policy ID  Covered party ID  Policy Hernadez



   type      

 

 AETNA PPO PRIMARY        



 [id number]        

 

 AETNA PPO PRIMARY        



 [id number]        

 

 AETNA (PPO)        

 

 AETNA (PPO)        

 

 AETNA (PPO)        







Encounters







 Encounter  Performer  Location  Date

 

 Office Visit  Gumaro Jaquez MD  [facility] Internal  Oct 
14, 2014



     Med  







Allergies, Adverse Reactions, Alerts







 Type  Substance  Reaction  Status

 

 Drug allergy  PCN    Active







Problems







 Problem  Effective Dates  Problem Status

 

 HYPERCHOLESTEROLEMIA    Active

 

 HTN    Active







Procedures







 Date  Description  Comments

 

 Aug 09, 2012  smoking status  never smoker

 

 Oct 14, 2014  smoking status  Never smoker







Medications







 Medication  Instructions  Start Date  Status

 

 SIMVASTATIN 20 MG TABS  1 po qd  Aug 09, 2012  Active

 

 LISINOPRIL-HYDROCHLOROTHIAZIDE 10-12.5 MG TABS  1 po qd  2013  Active







Vital Signs







 Date  Description  Test  Result

 

 Aug 09, 2012  height E&M - 8302-2  HEIGHT  74 in

 

 Aug 09, 2012  weight E&M - 3141-9  WEIGHT  230 lb

 

 Aug 09, 2012  pulse rate E&M - 8867-4  PULSE RATE  80 /min

 

 Aug 09, 2012  blood pressure, systolic - 8480-6  BP SYSTOLIC  150 mm Hg

 

 Aug 09, 2012  blood pressure, diastolic - 8462-4  BP DIASTOLIC  78 mm Hg

 

 2013  weight E&M - 3141-9  WEIGHT  220 lb

 

 2013  pulse rate E&M - 8867-4  PULSE RATE  88 /min

 

 2013  blood pressure, systolic - 8480-6  BP SYSTOLIC  120 mm Hg

 

 2013  blood pressure, diastolic - 8462-4  BP DIASTOLIC  80 mm Hg

 

 Aug 22, 2013  weight E&M - 3141-9  WEIGHT  233 lb

 

 Aug 22, 2013  pulse rate E&M - 8867-4  PULSE RATE  88 /min

 

 Aug 22, 2013  blood pressure, systolic - 8480-6  BP SYSTOLIC  130 mm Hg

 

 Aug 22, 2013  blood pressure, diastolic - 8462-4  BP DIASTOLIC  84 mm Hg

 

 2014  weight E&M - 3141-9  WEIGHT  231 lb

 

 2014  temperature E&M  TEMPERATURE  98 deg f

 

 2014  pulse rate E&M - 8867-4  PULSE RATE  72 /min

 

 2014  blood pressure, systolic - 8480-6  BP SYSTOLIC  143 mm Hg

 

 2014  blood pressure, diastolic - 8462-4  BP DIASTOLIC  87 mm Hg

 

 Oct 14, 2014  weight E&M - 3141-9  WEIGHT  228 lb

 

 Oct 14, 2014  temperature E&M  TEMPERATURE  97 deg f

 

 Oct 14, 2014  pulse rate E&M - 8867-4  PULSE RATE  57 /min

 

 Oct 14, 2014  blood pressure, systolic - 8480-6  BP SYSTOLIC  166 mm Hg

 

 Oct 14, 2014  blood pressure, diastolic - 8462-4  BP DIASTOLIC  89 mm Hg







Results







 Date  Description  Test Name  Value  Reference  Interpretation  Status

 

 May 12,  prostate specific  PSA  3.02 ng/mL      



   antigen          

 

 Aug 15,  prostate specific  PSA  1.31 ng/mL      



   antigen          

 

 May 03,  prostate specific  PSA  1.2 ng/mL      



   antigen          

 

 Oct 13,  sodium, serum  SODIUM  140 mmol/L  135-143    



 2014            

 

 Oct 13,  potassium, serum  POTASSIUM  4.4 mmol/L  3.3-5.0    



 2014            

 

 Oct 13,  albumin, serum  ALBUMIN  4.3 g/dL  3.5-5.0    



 2014            

 

 Oct 13,  calcium, serum  CALCIUM  9.4 mg/dL  8.6-9.8    



 2014            

 

 Oct 13,  creatinine, serum  CREATININE  0.73 mg/dL  0.46-1.20    



 2014            

 

 Oct 13,  urea nitrogen, blood  BUN  15 mg/dL  10-22    



 2014            

 

 Oct 13,  alkaline phosphatase,  ALK PHOS  80 U/L  32-96    



   serum          

 

 Oct 13,  aspartate  SGOT (AST)  27 U/L  10-42    



   aminotransferase          



   (SGOT), serum          

 

 Oct 13,  alanine  SGPT (ALT)  29 U/L  -43    



   aminotransferase          



   (SGPT), serum          

 

 Oct 13,  cholesterol, serum  CHOLESTEROL  259 mg/dl  120-200  High  



 2014            

 

 Oct 13,  HDL cholesterol,  HDL  49 mg/dl  -62    



   serum          

 

 Oct 13,  LDL cholesterol,  LDL  145 mg/dl  0-130  High  



   serum          

 

 Oct 13,  prostate specific  PSA  1.16 ng/mL  0-4.0    



   antigen          

 

 Oct 13,  thyroid stimulating  TSH  1.48  0.34-5.60    



   hormone, serum    uIU/mL      

 

 ,  international  INR  null null    Normal  



   normalized ratio          



   (INR)

## 2019-09-11 NOTE — XMS REPORT
Summary of Care

 Created on:2019



Patient:Shekhar Griffin

Sex:Male

:1955

External Reference #:[id number]





Demographics







 Address  [address]



   [place] [address]

 

 Mobile Phone  [phone]

 

 Home Phone  [phone]

 

 Phone  [phone]

 

 Home Phone  [phone]

 

 Email Address  [email or web address]

 

 Preferred Language  English

 

 Marital Status  

 

 Jewish Affiliation  Unknown

 

 Race  White

 

 Ethnic Group  Not  or 









Author







 Organization  [facility]

 

 Address  [address] [place] [address]









Support







 Name  Relationship  Address  Phone

 

 Gaby Mehta  Unavailable  [address]  [phone]



     [place] [address]  









Care Team Providers







 Name  Role  Phone

 

 Clark Daley  Primary Care Provider  Unavailable









Reason for Visit







 Reason  Comments

 

 Assessment  







Encounter Details







 Date  Type  Department  Care Team  Description

 

 2019  Telephone  [facility] Cardiology-  Bobbi Orr MD



  Assessment



     [place]



  [address] [facility]



  



     [address] [facility],  [address]



  



     [address]



  [place] [address]



  



     [place] [address] [phone] (Fax)  







Allergies

No Known Allergiesdocumented as of this encounter (statuses as of 2019)



Medications







 Medication  Sig  Dispensed  Refills  Start Date  End Date  Status

 

 folic  Take  by mouth    0      Active



 acid/multivit-min/lutein  daily.          



 (CENTRUM SILVER ORAL)            

 

 acetaminophen/diphenhydr  Take  by mouth    0      Active



 amine (TYLENOL PM ORAL)  as needed.          

 

 Bifidobacterium infantis  Take  by mouth    0      Active



 (ALIGN ORAL)  daily.          

 

 vit A/vit C/vit  Take  by mouth    0      Active



 E/zinc/copper  daily.          



 (PRESERVISION AREDS            



 ORAL)            

 

 simvastatin 40 mg tablet  Take 40 mg by    0      Active



   mouth at          



   bedtime.          

 

 sucralfate 1 gram tablet  Take 3 g by    0      Active



   mouth before          



   meals and at          



   bedtime.          

 

 BENZONATATE ORAL  Take  by mouth    0      Active



   3 (three) times          



   daily.          

 

 triamterene-hydrochlorot  Take 1 capsule  30 capsule  2  2019    Active



 hiazide (DYAZIDE)  by mouth every          



 37.5-25 mg per capsule  morning.          

 

 amLODIPine 10 mg tablet  Take 1 tablet    0  2019    Active



   by mouth every          



   morning.          

 

 lisinopril 40 mg  Take 1 tablet  30 tablet  2  2019    Active



 tabletIndications:  by mouth every          



 Essential hypertension  morning.          



documented as of this encounter (statuses as of 2019)



Active Problems

Not on filedocumented as of this encounter (statuses as of 2019)



Social History







 Tobacco Use  Types  Packs/Day  Years Used  Date

 

 Former Smoker        









 Smokeless Tobacco: Former User      









 Alcohol Use  Drinks/Week  oz/Week  Comments

 

 Yes      social









 Sex Assigned at Birth  Date Recorded

 

 Not on file  









 Job Start Date  Occupation  Industry

 

 Not on file  Not on file  Not on file









 Travel History  Travel Start  Travel End









 No recent travel history available.



documented as of this encounter



Last Filed Vital Signs

Not on filedocumented in this encounter



Plan of Treatment







 Date  Type  Specialty  Care Team  Description

 

 2019  Office Visit  Pulmonary Disease  Bekah Dennis [address]



  



       [address]



  



       [place] [address]



  



       [phone] (Fax)  

 

 2019  Office Visit  Cardiology  Bobbi Orr MD



  



       [address] [place] [address]



  



       [address]



  



       [place] [address]



  



       [phone] (Fax)  









 Health Maintenance  Due Date  Last Done  Comments

 

 HEPATITIS C (HCV) SCREEN  1956    

 

 DTaP,Tdap,and Td Vaccines (1 -  1975    



 Tdap)      

 

 COLONOSCOPY  2006    

 

 Zoster Recombinant Vaccine  2006    



 (SHINGRIX) (1 of 2)      

 

 LUNG CANCER SCREEN: Recommended  2011    



 for age 55-80 with 30 + pack year      



 history      

 

 INFLUENZA VACCINE  2019    

 

 PNEUMOCOCCAL 0-64 YEARS COMBINED  Aged Out    No longer eligible based on



 SERIES      patient's age to complete this



       topic



documented as of this encounter



Results

Not on filedocumented in this encounter



Insurance







 Payer  Benefit Plan  Subscriber ID  Effective Dates  Phone  Address  Type



   / Group          

 

 BCBS [facility]  [id number]  2019-Radha  [phone]  [address]  
PPO/POS



 [place]      t  7  [id number]



  



           [place], [place]  



           [address]  



documented as of this encounter

## 2019-09-11 NOTE — XMS REPORT
Summary of Care

 Created on:2019



Patient:Shekhar Griffin

Sex:Male

:1955

External Reference #:[id number]





Demographics







 Address  [address]



   [place] [address]

 

 Mobile Phone  [phone]

 

 Home Phone  [phone]

 

 Phone  [phone]

 

 Home Phone  [phone]

 

 Email Address  [email or web address]

 

 Preferred Language  English

 

 Marital Status  

 

 Moravian Affiliation  Unknown

 

 Race  White

 

 Ethnic Group  Not  or 









Author







 Organization  [facility]

 

 Address  [address] [place] [address]









Support







 Name  Relationship  Address  Phone

 

 Gaby Mehta  Unavailable  [address]  [phone]



     [place] [address]  









Care Team Providers







 Name  Role  Phone

 

 Clark Daley  Primary Care Provider  Unavailable









Reason for Visit







 Reason  Comments

 

 Appointment  







Encounter Details







 Date  Type  Department  Care Team  Description

 

 2019  Telephone  [facility]  Bekah Dennis



  Appointment



     Clinic



  [address]



  



     [address]



  [address]



  



     [place] [address]



  [place] [address] [phone] (Fax)  







Allergies

No Known Allergiesdocumented as of this encounter (statuses as of 2019)



Medications







 Medication  Sig  Dispensed  Refills  Start Date  End Date  Status

 

 vit A/vit C/vit  Take  by mouth    0      Active



 E/zinc/copper  daily.          



 (PRESERVISION AREDS            



 ORAL)            

 

 simvastatin 40 mg  Take 40 mg by    0      Active



 tablet  mouth at bedtime.          

 

 triamterene-hydrochlor  Take 1 capsule by  30 capsule  2  2019    Active



 othiazide (DYAZIDE)  mouth every          



 37.5-25 mg per capsule  morning.          

 

 amLODIPine 10 mg  Take 1 tablet by    0  2019    Active



 tablet  mouth every          



   morning.          

 

 lisinopril 40 mg  Take 1 tablet by  30 tablet  2  2019    Active



 tabletIndications:  mouth every          



 Essential hypertension  morning.          



documented as of this encounter (statuses as of 2019)



Active Problems

Not on filedocumented as of this encounter (statuses as of 2019)



Social History







 Tobacco Use  Types  Packs/Day  Years Used  Date

 

 Former Smoker        









 Smokeless Tobacco: Former User      









 Alcohol Use  Drinks/Week  oz/Week  Comments

 

 Yes      social









 Sex Assigned at Birth  Date Recorded

 

 Not on file  









 Job Start Date  Occupation  Industry

 

 Not on file  Not on file  Not on file









 Travel History  Travel Start  Travel End









 No recent travel history available.



documented as of this encounter



Last Filed Vital Signs

Not on filedocumented in this encounter



Plan of Treatment







 Date  Type  Specialty  Care Team  Description

 

 2019  Technician Visit  Sleep Disorder  [facility] [address]



[place], [place] [address] [phone] (Fax)  



     Diagnostic  



1, Adc Sleep Lab Bed  

 

 2019  [facility] [address]



  



       [place], [place] [address] [phone] (Fax)  

 

 2020  Office Visit  Cardiology  Bobbi Orr MD



  



       [address] [place] [address]



  



       [address]



  



       [place], [place] [address] [phone] (Fax)  









 Health Maintenance  Due Date  Last Done  Comments

 

 HEPATITIS C (HCV) SCREEN  1955    

 

 DTaP,Tdap,and Td Vaccines (1 -  1975    



 Tdap)      

 

 COLONOSCOPY  2006    

 

 Zoster Recombinant Vaccine  2006    



 (SHINGRIX) (1 of 2)      

 

 LUNG CANCER SCREEN: Recommended  2011    



 for age 55-80 with 30 + pack year      



 history      

 

 INFLUENZA VACCINE (#1)  2019    

 

 PNEUMOCOCCAL 0-64 YEARS COMBINED  Aged Out    No longer eligible based on



 SERIES      patient's age to complete this



       topic



documented as of this encounter



Results

Not on filedocumented in this encounter



Insurance







 Payer  Benefit Plan  Subscriber ID  Effective Dates  Phone  Address  Type



   / Group          

 

 [employer] OF  [facility]  [id number]  2019-Radha  [phone]  [address]  
[address]



 [place]      [id number]



  



           [place]  



           [address]  



documented as of this encounter

## 2019-09-11 NOTE — RAD REPORT
EXAM DESCRIPTION:  RAD - Shoulder 1 View - 9/11/2019 10:30 am

 

CLINICAL HISTORY:  POST-OP

Postop right shoulder

 

COMPARISON:  No comparisons

 

FINDINGS:  Mild soft tissue swelling is seen. Mild AC joint and glenohumeral joint arthritic changes.
 No acute fracture or dislocation.

## 2019-09-11 NOTE — XMS REPORT
Summary of Care

 Created on:2019



Patient:Shekhar Griffin

Sex:Male

:1955

External Reference #:[id number]





Demographics







 Address  [address] [place] [address]

 

 Mobile Phone  [phone]

 

 Home Phone  [phone]

 

 Phone  [phone]

 

 Home Phone  [phone]

 

 Email Address  [email or web address]

 

 Preferred Language  English

 

 Marital Status  

 

 Anabaptist Affiliation  Unknown

 

 Race  White

 

 Ethnic Group  Not  or 









Author







 Organization  [facility]

 

 Address  [address] [place] [address]









Support







 Name  Relationship  Address  Phone

 

 Gaby Mehta  Unavailable  [address] [facility]  [phone]



     [place] [address]  









Care Team Providers







 Name  Role  Phone

 

 Clark Daley  Primary Care Provider  Unavailable









Reason for Referral

 (Routine)





 Status  Reason  Specialty  Diagnoses /  Referred By  Referred To



       Procedures  Contact  Contact

 

 New Request    Sleep Disorder  Diagnoses



Observed sleep apnea  Jeannie,  



     Diagnostic  



Procedures



HOME SLEEP TEST  Bekah SAHA



  



         [address] [place] [address]  



         



  



         [address]  



         [address]



  



         Phone:  



         [phone]



  



         Fax:  



         [phone]  









Reason for Visit







 Reason  Comments

 

 Sleep Apnea  







Encounter Details







 Date  Type  Department  Care Team  Description

 

 2019  Telephone  [facility]  Bekah Dennis



  Sleep Apnea



     Clinic



  [address] [place] [address] , [address]



  [address] [address]



  [place] [address] [phone] (Fax)  







Allergies

No Known Allergiesdocumented as of this encounter (statuses as of 2019)



Medications







 Medication  Sig  Dispensed  Refills  Start Date  End Date  Status

 

 vit A/vit C/vit  Take  by mouth    0      Active



 E/zinc/copper  daily.          



 (PRESERVISION AREDS            



 ORAL)            

 

 simvastatin 40 mg  Take 40 mg by    0      Active



 tablet  mouth at bedtime.          

 

 triamterene-hydrochlor  Take 1 capsule by  30 capsule  2  2019    Active



 othiazide (DYAZIDE)  mouth every          



 37.5-25 mg per capsule  morning.          

 

 amLODIPine 10 mg  Take 1 tablet by    0  2019    Active



 tablet  mouth every          



   morning.          

 

 lisinopril 40 mg  Take 1 tablet by  30 tablet  2  2019    Active



 tabletIndications:  mouth every          



 Essential hypertension  morning.          



documented as of this encounter (statuses as of 2019)



Active Problems

Not on filedocumented as of this encounter (statuses as of 2019)



Social History







 Tobacco Use  Types  Packs/Day  Years Used  Date

 

 Former Smoker        









 Smokeless Tobacco: Former User      









 Alcohol Use  Drinks/Week  oz/Week  Comments

 

 Yes      social









 Sex Assigned at Birth  Date Recorded

 

 Not on file  









 Job Start Date  Occupation  Industry

 

 Not on file  Not on file  Not on file









 Travel History  Travel Start  Travel End









 No recent travel history available.



documented as of this encounter



Last Filed Vital Signs

Not on filedocumented in this encounter



Plan of Treatment







 Date  Type  Specialty  Care Team  Description

 

 2019  Technician Visit  Sleep Disorder  [address] [place] [address]



[phone] (Fax)  



     Diagnostic  



1, Adc Sleep Lab Bed  

 

 2019  Office Visit  Pulmonary Disease  [facility] [address] [place] [address]



  



       [phone] (Fax)  

 

 2020  Office Visit  Cardiology  Bobbi Orr MD



  



       [address] [place] [address]



  



       [address]



  



       [place], [place] [address] [phone] (Fax)  









 Name  Type  Priority  Associated Diagnoses  Order Schedule

 

 HOME SLEEP TEST  PROCEDURES  Routine  Observed sleep apnea  Ordered: 2019









 Health Maintenance  Due Date  Last Done  Comments

 

 HEPATITIS C (HCV) SCREEN  1955    

 

 DTaP,Tdap,and Td Vaccines (1 -  1975    



 Tdap)      

 

 COLONOSCOPY  2006    

 

 Zoster Recombinant Vaccine  2006    



 (SHINGRIX) (1 of 2)      

 

 LUNG CANCER SCREEN: Recommended  2011    



 for age 55-80 with 30 + pack year      



 history      

 

 INFLUENZA VACCINE (#1)  2019    

 

 PNEUMOCOCCAL 0-64 YEARS COMBINED  Aged Out    No longer eligible based on



 SERIES      patient's age to complete this



       topic



documented as of this encounter



Results

Not on filedocumented in this encounter



Visit Diagnoses







 Diagnosis

 

 Observed sleep apnea - Primary







 Unspecified sleep apnea



documented in this encounter



Insurance







 Payer  Benefit Plan  Subscriber ID  Effective Dates  Phone  Address  Type



   / Group          

 

 [facility]  [id number]  2019-Radha  [phone]  [address]  
PP[address]/POS



 [place]      [id number]



  



           [place]  



           [address]  



documented as of this encounter

## 2019-09-11 NOTE — XMS REPORT
Continuity of Care Document

 Created on:2019



Patient:ISAURA POP

Sex:Male

:1955

External Reference #:[id number]





Demographics







 Address  [address] [place] [address]

 

 Home Phone  [phone]

 

 Phone  [phone]

 

 Preferred Language  en-US

 

 Marital Status  Unknown

 

 [employer] Affiliation  Unknown

 

 Race  Unknown

 

 Ethnic Group  Unknown









Author







 Organization  [facility]









Care Team Providers







 Name  Role  Phone

 

 [facility]  Unavailable  Unavailable









Problems







 Problem  Status  Onset  Classification  Date  Comments  Source



     Date    Reported    



             



             



             

 

 HYPERCHOLESTEROLEMIA  Active    Condition  2015    



             Medical



             Group



             



             

 

 HTN  Active    Condition  2015    



             Medical



             Group



             



             

 

 Benign hypertension1  Active    Problem  2019  Data  



           migrated  Medical



           from [facility]  



           on 5/30/15.  



             



             

 

 Hypercholesterolemia2  Active    Problem  2019  Data  



           migrated  Medical



           from [facility]  



           on 5/30/15.  



             



             







Medications







 Medication  Details  Route  Status  Patient  Ordering  Order  Source



         Instructions  Provider  Date  



               



               



               

 

 lisinopril 20 mg  See    Active        



 oral tablet  [facility]          018  [facility]



   ns, # 30            Group



   tab,            



   Refill(s)            



   6, TAKE 1            



   TABLET BY            



   MOUTH            



   EVERY DAY,            



   Pharmacy:            



   [facility] #[id number]            



               



               

 

 lisinopril 20 mg  1 tablet,    No Longer        



 oral tablet  PO, Daily,    Active      018  Medical



   0            Group



   Refill(s)            



               



               

 

 amLODIPine 10 mg  See    Active        



 oral tablet  [facility]          018  [facility]



   ns, TAKE 1            Group



   TABLET BY            



   MOUTH            



   EVERY DAY,            



   # 90 tab,            



   3            



   Refill(s),            



   Pharmacy:            



   [id number]            



               



               

 

 simvastatin 40  See    Active        



 mg oral tablet  [facility]          018  Medical



   ns, TAKE 1            Group



   TABLET BY            



   MOUTH AT            



   BEDTIME, #            



   90 tab, 3            



   Refill(s),            



   Pharmacy:            



   [facility]            



   [id number]            



               



               

 

 lisinopril 10 mg  See    No Longer        



 oral tablet  [facility]    Active      018  Medical



   ns, TAKE 2            Group



   TABLET BY            



   MOUTH            



   EVERY DAY,            



   # 180 tab,            



   3            



   Refill(s),            



   Pharmacy:            



   [facility]            



   [id number]            



               



               

 

 lisinopril 10 mg  See    Active      05/10/2  



 oral tablet  [facility]          018  [facility]



   ns, TAKE 2            Group



   TABLET BY            



   MOUTH            



   EVERY DAY,            



   # 180 tab,            



   3            



   Refill(s),            



   Pharmacy:            



   [id number]            



               



               

 

 simvastatin 40  See    Active        



 mg oral tablet  [facility]          018  Medical



   ns, # 90            Group



   tab,            



   Refill(s)            



   3, TAKE 1            



   TABLET BY            



   MOUTH AT            



   BEDTIME,            



   Pharmacy:            



   [facility] #[id number]            



               



               

 

 triamcinolone  40 mg,    Inactive        



 ACETONIDE 40  Route: IM,          018  Medical



 mg/mL injectable  ONCE,            Group



 suspension  Dosing            



   Weight            



   105.057,            



   kg,            



   (KENALOG),            



   Start            



   date:            



   18            



   14:59:00            



   CDT, Stop            



   date:            



   18            



   14:59:00            



   CDT            



               



               

 

 benzonatate 100  100 mg=1    No Longer        [facility]



 mg oral capsule  cap, PO,    Active      018  Medical



   TID, do            Group



   not crush            



   or chew, X            



   10 day, #            



   30 cap, 0            



   Refill(s),            



   Pharmacy:            



   [facility] #[id number]            



               



               

 

 Levofloxacin 500  500 mg=1    No Longer        [facility]



 MG Oral Tablet  tab, PO,    Active      018  Medical



 [Levaquin]  Q24H, X 7            Group



   day, # 7            



   tab, 0            



   Refill(s),            



   Pharmacy:            



   [id number]            



               



               

 

 sucralfate 1 g  1 gm=1    Active        



 oral tablet  tab, PO,          017  Medical



   Before            Group



   Meals &            



   Bedtime, #            



   120 tab, 1            



   Refill(s),            



   Pharmacy:            



   [id number]            



               



               

 

 omeprazole 20 mg  20 mg=1    Active        [facility]



 oral enteric  tab, PO,          017  Medical



 coated tablet  QAM, # 30            Group



   tab, 2            



   Refill(s),            



   Pharmacy:            



   [facility] #[id number]            



               



               

 

 Ascorbic Acid  1 tab, PO,    Active        [facility]



 113 MG / Beta  Daily, 0          017  Medical



 Carotene 7160 MG  Refill(s)            Group



 / cuprous oxide              



 0.4 MG /              



 dl-alpha              



 tocopheryl              



 acetate 100 UNT              



 / Zinc Oxide              



 17.4 MG Oral              



 Tablet              



 [PreserVision]              



               



               

 

 AMLODIPINE  1 po qam    Active        



 BESYLATE 5 MG            015  Medical



 TABS              Group



               



               

 

 LISINOPRIL-HYDRO  1 po qd    Active        [facility]



 CHLOROTHIAZIDE            013  Medical



 10-12.5 MG TABS              Group



               



               

 

 LISINOPRIL-HYDRO  1 po qd    Active        [facility]



 CHLOROTHIAZIDE            013  Medical



 10-12.5 MG TABS              Group



               



               

 

 SIMVASTATIN 20  1 po qd    Active        [facility]



 MG TABS            012  Medical



               Group



               



               

 

 SIMVASTATIN 20  1 po qd    Active        



 MG TABS            012  Medical



               Group



               



               







Allergies, Adverse Reactions, Alerts







 Substance  Category  Reaction  Severity  Reaction  Status  Date  Comments  
Source



         type    Reported    



                 



                 



                 

 

 PCN  Drug      PCN        



   allergy          3    Medical



                 Group



                 



                 

 

 penicillins  Assertion      Drug  Active    Data  



 <sup>1</sup        allergy    3  migrated  Medical



 >              from [facility]  



               on 7/30/15.  



               Originally  



               documented  



               as PCN.  



                 



                 







Immunizations







 No Data Provided for This Section







Results







 Order Name  Results  Value  Reference  Date  Interpretation  Comments  Source



       Range        



               



               

 

 Chemistry  SODIUM  138  135 - 143  2015      Medical



               Group



               



               

 

 Chemistry  POTASSIUM  4.4  3.3 - 5.0        



               Medical



               Group



               



               

 

 Chemistry  ALBUMIN  4.4  3.5 - 5.0  2015      Medical



               Group



               



               

 

 Chemistry  CALCIUM  9.4  8.6 - 9.8        



               Medical



               Group



               



               

 

 Chemistry  CREATININE  0.82  0.46 - 1.20        



               Medical



               Group



               



               

 

 Chemistry  BUN  17  10 - 22        



               Medical



               Group



               



               

 

 Chemistry  ALK PHOS  86  32 - 96        



               Medical



               Group



               



               

 

 Chemistry  SGOT (AST)  22  10 - 42        



               Medical



               Group



               



               

 

 Chemistry  SGPT (ALT)  17  11 - 43  2015      Medical



               Group



               



               

 

 Chemistry  CHOLESTEROL  228  120 - 200  2015      Medical



               Group



               



               

 

 Chemistry  HDL  51  26 - 62  2015      Medical



               Group



               



               

 

 Chemistry  LDL  132  0 - 130  2015      Medical



               Group



               



               

 

 Hematology  HGB  14.3  12.3 - 17.3  2015      Medical



               Group



               



               

 

 Hematology  HCT  42.9  36.7 - 50.5  2015      Medical



               Group



               



               

 

 Chemistry  SODIUM  140  135 - 143  10/13/      



               Medical



               Group



               



               

 

 Chemistry  POTASSIUM  4.4  3.3 - 5.0  10/13/      



               Medical



               Group



               



               

 

 Chemistry  SODIUM  140  135 - 143  10/13/      [facility]



         2014      Medical



               Group



               



               

 

 Chemistry  POTASSIUM  4.4  3.3 - 5.0  10/13/      [facility]



         2014      Medical



               Group



               



               

 

 Chemistry  ALBUMIN  4.3  3.5 - 5.0  10/13/      [facility]



         2014      Medical



               Group



               



               

 

 Chemistry  CALCIUM  9.4  8.6 - 9.8  10/13/      [facility]



         2014      Medical



               Group



               



               

 

 Chemistry  CREATININE  0.73  0.46 - 1.20  10/13/      [facility]



         2014      Medical



               Group



               



               

 

 Chemistry  BUN  15  10 - 22  10/13/      [facility]



         2014      Medical



               Group



               



               

 

 Chemistry  ALK PHOS  80  32 - 96  10/13/      [facility]



         2014      Medical



               Group



               



               

 

 Chemistry  SGOT (AST)  27  10 - 42  10/13/      [facility]



         2014      Medical



               Group



               



               

 

 Chemistry  SGPT (ALT)  29  11 - 43  10/13/      [facility]



         2014      Medical



               Group



               



               

 

 Chemistry  CHOLESTEROL  259  120 - 200  10/13/      [facility]



         2014      Medical



               Group



               



               

 

 Chemistry  HDL  49  26 - 62  10/13/      [facility]



         2014      Medical



               Group



               



               

 

 Chemistry  LDL  145  0 - 130  10/13/      [facility]



         2014      Medical



               Group



               



               

 

 Chemistry  PSA  1.16  0 - 4.0  10/13/      [facility]



         2014      Medical



               Group



               



               

 

 Chemistry  TSH  1.48  0.34 - 5.60  10/13/      [facility]



         2014      Medical



               Group



               



               

 

 Chemistry  PSA  1.31    08/15/      [facility]



         2013      Medical



               Group



               



               

 

 Chemistry  PSA  1.31    08/15/      [facility]



         2013      Medical



               Group



               



               

 

 Chemistry  PSA  3.02    2011      Medical



               Group



               



               

 

 Chemistry  PSA  3.02    2011      Medical



               Group



               



               

 

 Chemistry  PSA  1.2    2005      Medical



               Group



               



               

 

 Chemistry  PSA  1.2    2005      Medical



               Group



               



               







Pathology Reports







 No Data Provided for This Section







Diagnostic Reports







 No Data Provided for This Section







Consultation Notes







 No Data Provided for This Section







Discharge Summaries







 No Data Provided for This Section







History and Physicals







 No Data Provided for This Section







Vital Signs







 Vital Sign  Value  Date  Comments  Source



         

 

 Weight  110.966  2018     Medical Group



         



         

 

 BMI Calculated  30.58  2018     Medical Group



         



         

 

 Height  190.5 cm  2018     Medical Group



         



         

 

 Heart Rate  80  2018     Medical Group



         



         

 

 Systolic (mm Hg)  151  2018     Medical Group



         



         

 

 Diastolic (mm Hg)  96  2018     Medical Group



         



         

 

 Weight  106.989  05/10/2018     Medical Group



         



         

 

 BMI Calculated  29.48  05/10/2018     Medical Group



         



         

 

 Heart Rate  68  05/10/2018     Medical Group



         



         

 

 Height  190.5 cm  05/10/2018     Medical Group



         



         

 

 Systolic (mm Hg)  153  05/10/2018     Medical Group



         



         

 

 Diastolic (mm Hg)  91  05/10/2018     [facility] Group



         



         

 

 Weight  105.057  2018     Medical Group



         



         

 

 Heart Rate  67  2018     [facility] Group



         



         

 

 Respitory Rate  16  2018     [facility] Group



         



         

 

 Systolic (mm Hg)  140  2018     [facility] Group



         



         

 

 Diastolic (mm Hg)  90  2018     [facility]



         



         

 

 Weight  103.636  2017     [facility] Group



         



         

 

 Heart Rate  64  2017    [facility]



         



         

 

 Respitory Rate  16  2017    [facility]



         



         

 

 Systolic (mm Hg)  134  2017    [facility]



         



         

 

 Diastolic (mm Hg)  74  2017     [facility]



         



         

 

 BMI Calculated  29.07  2017    [facility]



         



         

 

 Weight  105.511  2017    [facility]



         



         

 

 Systolic (mm Hg)  112  2017    [facility]



         



         

 

 Diastolic (mm Hg)  74  2017    [facility]



         



         

 

 Height  190.5 cm  2017    [facility]



         



         

 

 Respitory Rate  14  2017     [facility] Group



         



         

 

 Heart Rate  76  2017     [facility] Group



         



         

 

 Weight  106.818  11/15/2017     [facility] Group



         



         

 

 BMI Calculated  29.43  11/15/2017     [facility] Group



         



         

 

 Height  190.5 cm  11/15/2017    [facility]



         



         

 

 Systolic (mm Hg)  138  11/15/2017    [facility]



         



         

 

 Diastolic (mm Hg)  86  11/15/2017     [facility] Group



         



         

 

 Heart Rate  59  11/15/2017     [facility] Group



         



         

 

 Weight  216  2015    [facility]



         



         

 

 Temperature Oral (F)  97 F  2015     [facility] Group



         



         

 

 Heart Rate  64  2015    [facility]



         



         

 

 Systolic (mm Hg)  160  2015    [facility]



         



         

 

 Diastolic (mm Hg)  94  2015     [facility]



         



         

 

 Weight  228  10/14/2014     [facility] Group



         



         

 

 Temperature Oral (F)  97 F  10/14/2014     [facility]



         



         

 

 Heart Rate  57  10/14/2014    [facility]



         



         

 

 Systolic (mm Hg)  166  10/14/2014     [facility] Group



         



         

 

 Diastolic (mm Hg)  89  10/14/2014     [facility] Group



         



         

 

 Weight  231  2014     [facility]



         



         

 

 Temperature Oral (F)  98 F  2014     [facility]



         



         

 

 Heart Rate  72  2014     [facility]



         



         

 

 Systolic (mm Hg)  143  2014     [facility]



         



         

 

 Diastolic (mm Hg)  87  2014     [facility]



         



         

 

 Weight  233  2013     [facility]



         



         

 

 Heart Rate  88  2013    [facility]



         



         

 

 Systolic (mm Hg)  130  2013     [facility]



         



         

 

 Diastolic (mm Hg)  84  2013     [facility]



         



         

 

 Weight  220  2013     [facility]



         



         

 

 Heart Rate  88  2013     [facility]



         



         

 

 Systolic (mm Hg)  120  2013     [facility]



         



         

 

 Diastolic (mm Hg)  80  2013     [facility]



         



         

 

 Height  74  2012     [facility]



         



         

 

 Weight  230  2012     [facility]



         



         

 

 Heart Rate  80  2012     [facility]



         



         

 

 Systolic (mm Hg)  150  2012     [facility]



         



         

 

 Diastolic (mm Hg)  78  2012     [facility]



         



         







Encounters







 Location  Location  Encounter  Encounter  Reason  Attending  ADM  DC  Status  
Source



   Details  Type  Number  For  Provider  Date  Date    



         Visit          



                   



                   



                   

 

 [facility]    Lab Report  [id number]    Geoff  10/13  10/13    [facility] [place]      [address]  /2014    Medical



 Medical          MD        Group



 [place]                  



 Internal                  



 Med                  



                   



                   

 

 [facility]    Office  [id number]    Geoff  10/14  10/14    [facility]    Visit  [address]  /2014    Medical



 Medical          MD        Group



 [place]                  



 Internal                  



 Med                  



                   



                   

 

 [facility]    Lab Report  [id number]    Geoff      [facility] [place]      [address]  /2015    Medical



 Medical          MD        Group



 [place]                  



 Internal                  



 Med                  



                   



                   

 

 [facility]    Office  [id number]    Geoff      [facility]    Visit  [address]  /2015    Medical



 Medical          MD        Group



 [place]                  



 Internal                  



 Med                  



                   



                   

 

     Outpatient  [id number]        [facility]



           [place]  [facility]      [facility]



                   



                   



                   



                   

 

     Outpatient  [id number]        [facility]



           [place]  /      [place]



                   



                   



                   



                   

 

     Outpatient  [id number]        [facility]



           [place]        [place]



                   



                   



                   



                   

 

     Outpatient  [id number]        [facility]



           [place]        [place]



                   



                   



                   



                   

 

     Outpatient  [id number]        [facility]  [facility]



           [place]        [place]



                   



                   



                   



                   

 

     Outpatient  [id number]    GE[facility]  11/15    [facility]



           [facility]  [id number]    Geoff  11/15  11/16    



 Internal          [place]  /2017    Medical



 Medicine                  Group



 [place]                  



                   



                   



                   

 

     Outpatient  [id number]        [facility]



           [facility]    [facility]  [id number]          



 Family                Medical



 Medicine                  Group



 [place]                  



                   



                   



                   

 

     Outpatient  [id number]        [facility]



           [facility]  [id number]          



 Family                Medical



 Medicine                  Group



 [place]                  



                   



                   



                   

 

     Outpatient  [id number]        [facility]



           [facility]  [id number]          



 Family                Medical



 Medicine                  Group



 [place]                  



                   



                   



                   

 

 [facility]    Phone  [phone]          



 Family    Message        /2018    Medical



 Medicine                  Group



 [place]                  



                   



                   



                   

 

 [facility]    Phone  [phone]          



 Internal    Message        /2018    Medical



 Medicine                  Group



 [place]                  



                   



                   



                   

 

     Outpatient  [id number]    GEOFF  05/10    Active  [facility]



           [place]        [place]



                   



                   



                   



                   

 

 [facility]    Outpatient  [id number]    Geoff  05/10  05/11    



 Internal          [place]  /2018    Medical



 Medicine                  Group



 [place]                  



                   



                   



                   

 

     Outpatient  [id number]        [facility]



           [place]        [place]



                   



                   



                   



                   

 

 [facility]    Outpatient  [id number]    Geoff      



 Internal          [place]  /2018    Medical



 Medicine                  Group



 [place]                  



                   



                   



                   

 

 [facility]    Phone  [phone]      09/13  09/15    



 Internal    Message        /2018    Medical



 Medicine                  Group



 [place]                  



                   



                   



                   







Procedures







 Procedure  Code  Date  Perfomer  Comments  Source



           



           

 

 Operation  [id number]         Medical



           Group



           







Assessment and Plan







 No Data Provided for This Section







Plan of Care







 No Data Provided for This Section







Social History







 Social History  Date  Source



     

 

 Social History TypeResponse  2018   Medical Group



 Substance Abuse    



 Use: None.    



 Smoking Status    



 Never smoker; Type: Cigars; Exposure to Tobacco Smoke None; Cigarette Smoking 
Last 365 Days No; Reg Smoking Cessation Counseling No1    



 entered on: 18    



 1pt is a non-smoker    







Family History







 No Data Provided for This Section







Advance Directives







 No Data Provided for This Section







Functional Status







 No Data Provided for This Section

## 2019-09-11 NOTE — XMS REPORT
Summary of Care

 Created on:2019



Patient:Shekhar Griffin

Sex:Male

:1955

External Reference #:[id number]





Demographics







 Address  [address] [place] [address]

 

 Mobile Phone  [phone]

 

 Home Phone  [phone]

 

 Phone  [phone]

 

 Home Phone  [phone]

 

 Email Address  [email or web address]

 

 Preferred Language  English

 

 Marital Status  

 

 Gnosticist Affiliation  Unknown

 

 Race  White

 

 Ethnic Group  Not  or 









Author







 Organization  [facility]

 

 Address  [address] [place] [address]









Support







 Name  Relationship  Address  Phone

 

 Gaby Mehta  Unavailable  [address]  [phone]



     [place] [address]  









Care Team Providers







 Name  Role  Phone

 

 Clark Daley  Primary Care Provider  Unavailable









Reason for Visit







 Reason  Comments

 

 Pre-op Clearance  







Encounter Details







 Date  Type  Department  Care Team  Description

 

 2019  Telephone  [facility]-  Bobbi Orr MD



  Pre-op Clearance



     [address]  



     [address],  [address]IVE



  



     [address]



  [address]



  



     [place] [address]



  [place], [place] [id number] [phone] (Fax)  







Allergies

No Known Allergiesdocumented as of this encounter (statuses as of 2019)



Medications







 Medication  Sig  Dispensed  Refills  Start Date  End Date  Status

 

 folic  Take  by mouth    0      Active



 acid/multivit-min/lutein  daily.          



 (CENTRUM SILVER ORAL)            

 

 acetaminophen/diphenhydr  Take  by mouth    0      Active



 amine (TYLENOL PM ORAL)  as needed.          

 

 Bifidobacterium infantis  Take  by mouth    0      Active



 (ALIGN ORAL)  daily.          

 

 vit A/vit C/vit  Take  by mouth    0      Active



 E/zinc/copper  daily.          



 (PRESERVISION AREDS            



 ORAL)            

 

 simvastatin 40 mg tablet  Take 40 mg by    0      Active



   mouth at          



   bedtime.          

 

 sucralfate 1 gram tablet  Take 3 g by    0      Active



   mouth before          



   meals and at          



   bedtime.          

 

 BENZONATATE ORAL  Take  by mouth    0      Active



   3 (three) times          



   daily.          

 

 triamterene-hydrochlorot  Take 1 capsule  30 capsule  2  2019    Active



 hiazide (DYAZIDE)  by mouth every          



 37.5-25 mg per capsule  morning.          

 

 amLODIPine 10 mg tablet  Take 1 tablet    0  2019    Active



   by mouth every          



   morning.          

 

 lisinopril 40 mg  Take 1 tablet  30 tablet  2  2019    Active



 tabletIndications:  by mouth every          



 Essential hypertension  morning.          



documented as of this encounter (statuses as of 2019)



Active Problems

Not on filedocumented as of this encounter (statuses as of 2019)



Social History







 Tobacco Use  Types  Packs/Day  Years Used  Date

 

 Former Smoker        









 Smokeless Tobacco: Former User      









 Alcohol Use  Drinks/Week  oz/Week  Comments

 

 Yes      social









 Sex Assigned at Birth  Date Recorded

 

 Not on file  









 Job Start Date  Occupation  Industry

 

 Not on file  Not on file  Not on file









 Travel History  Travel Start  Travel End









 No recent travel history available.



documented as of this encounter



Last Filed Vital Signs

Not on filedocumented in this encounter



Plan of Treatment







 Date  Type  Specialty  Care Team  Description

 

 2019  Office Visit  Cardiology  Bobbi Orr MD



  



       [address] [place] [address]



  



       [address] [place] [id number] [phone] (Fax)  

 

 2019  Technician Visit  Sleep Disorder  [address] [place] [id number] [phone] (Fax)  



     Diagnostic  



1, [facility] Sleep Lab Bed  

 

 2019  Office Visit  Pulmonary Disease  [address] Garcia [address] [place] [address]



  



       [phone] (Fax)  









 Health Maintenance  Due Date  Last Done  Comments

 

 HEPATITIS C (HCV) SCREEN  1955    

 

 DTaP,Tdap,and Td Vaccines (1 -  1975    



 Tdap)      

 

 COLONOSCOPY  2006    

 

 Zoster Recombinant Vaccine  2006    



 (SHINGRIX) (1 of 2)      

 

 LUNG CANCER SCREEN: Recommended  2011    



 for age 55-80 with 30 + pack year      



 history      

 

 INFLUENZA VACCINE  2019    

 

 PNEUMOCOCCAL 0-64 YEARS COMBINED  Aged Out    No longer eligible based on



 SERIES      patient's age to complete this



       topic



documented as of this encounter



Results

Not on filedocumented in this encounter



Insurance







 Payer  Benefit Plan  Subscriber ID  Effective Dates  Phone  Address  Type



   / Group          

 

 [facility]  [id number]  2019-Radha  [phone]  [address]  
PPO/POS



 [facility]  [address]  [id number]



  



           [place]  



           [address]  



documented as of this encounter

## 2019-09-11 NOTE — P.BOP
Preoperative diagnosis: right shoulder rotator cuff tear, SLAP tear


Postoperative diagnosis: same


Primary procedure: right shoulder arthroscopic rotator cuff repair


Secondary procedure: right shoulder arthroscopic SLAP debridement


Other procedure(s): right shoulder open subpectoral biceps tenodesis


Estimated blood loss: 10 cc


Specimen: none


Findings: see dictation


Anesthesia: General


Complications: None


Implants: 4.75 x 19 mm swivelock, 7x 19 mm biotenodesis screw


Fluids & blood products: per anesthesia


Transferred to: Recovery Room


Condition: Good

## 2019-09-11 NOTE — XMS REPORT
Summary of Care

 Created on:2019



Patient:Shekhar Griffin

Sex:Male

:1955

External Reference #:[id number]





Demographics







 Address  [address] [place] [address]

 

 Mobile Phone  [phone]

 

 Home Phone  [phone]

 

 Phone  [phone]

 

 Home Phone  [phone]

 

 Email Address  [email or web address]

 

 Preferred Language  English

 

 Marital Status  

 

 Yazidism Affiliation  Unknown

 

 Race  White

 

 Ethnic Group  Not  or 









Author







 Organization  [facility]

 

 Address  [address] [place] [address]









Support







 Name  Relationship  Address  Phone

 

 Gaby Mehta  Unavailable  [address] [facility]  [phone]



     [place] [address]  









Care Team Providers







 Name  Role  Phone

 

 Clark Daley  Primary Care Provider  Unavailable









Reason for Referral

 (Routine)





 Status  Reason  Specialty  Diagnoses /  Referred By  Referred To



       Procedures  Contact  Contact

 

 New Request    Sleep Disorder  Diagnoses



Obstructive sleep apnea of adult  [facility]ly [facility], [facility],  



     Diagnostic  



Procedures



SLEEP STUDY, ATTENDED  [address]  



         [address]



  



         [place] [address]



  



         Phone:  



         [phone]



  



         Fax:  



         [phone]  









Reason for Visit







 Reason  Comments

 

 New Patient  

 

 Results  HST



 (Routine)





 Status  Reason  Specialty  Diagnoses /  Referred By  Referred To



       Procedures  Contact  Contact

 

 Closed    Sleep Disorder  Diagnoses



STEVEN (obstructive sleep apnea)  Bobbi Orr MD



  



     Diagnostic  



Procedures



CONSULT/REFERRAL SLEEP CLINIC ADULT PULM Preferred Location: [address]



  



         [address]



  



         [place]  



         [address]



  



         Phone:  



         [phone]



  



         Fax: [phone]  









Encounter Details







 Date  Type  Department  Care Team  Description

 

 2019  Office Visit  formerly [facility]  Bekah Dennis 



  Obstructive sleep



     Pulmonary Clinic



  [address] [place] [address] 



  apnea of [address]



  (Primary Dx)



     [address]



  [place] [address]



  



     [place]  [phone]



  



     [phone] (Fax)  



     [phone]    







Allergies

No Known Allergiesdocumented as of this encounter (statuses as of 2019)



Medications







 Medication  Sig  Dispensed  Refills  Start Date  End Date  Status

 

 folic  Take  by mouth    0      Active



 acid/multivit-min/lutein  daily.          



 (CENTRUM SILVER ORAL)            

 

 acetaminophen/diphenhydr  Take  by mouth    0      Active



 amine (TYLENOL PM ORAL)  as needed.          

 

 Bifidobacterium infantis  Take  by mouth    0      Active



 (ALIGN ORAL)  daily.          

 

 vit A/vit C/vit  Take  by mouth    0      Active



 E/zinc/copper  daily.          



 (PRESERVISION AREDS            



 ORAL)            

 

 simvastatin 40 mg tablet  Take 40 mg by    0      Active



   mouth at          



   bedtime.          

 

 sucralfate 1 gram tablet  Take 3 g by    0      Active



   mouth before          



   meals and at          



   bedtime.          

 

 BENZONATATE ORAL  Take  by mouth    0      Active



   3 (three) times          



   daily.          

 

 triamterene-hydrochlorot  Take 1 capsule  30 capsule  2  2019    Active



 hiazide (DYAZIDE)  by mouth every          



 37.5-25 mg per capsule  morning.          

 

 amLODIPine 10 mg tablet  Take 1 tablet    0  2019    Active



   by mouth every          



   morning.          

 

 lisinopril 40 mg  Take 1 tablet  30 tablet  2  2019    Active



 tabletIndications:  by mouth every          



 Essential hypertension  morning.          



documented as of this encounter (statuses as of 2019)



Active Problems

Not on filedocumented as of this encounter (statuses as of 2019)



Social History







 Tobacco Use  Types  Packs/Day  Years Used  Date

 

 Former Smoker        









 Smokeless Tobacco: Former User      









 Alcohol Use  Drinks/Week  oz/Week  Comments

 

 Yes      social









 Sex Assigned at Birth  Date Recorded

 

 Not on file  









 Job Start Date  Occupation  Industry

 

 Not on file  Not on file  Not on file









 Travel History  Travel Start  Travel End









 No recent travel history available.



documented as of this encounter



Last Filed Vital Signs







 Vital Sign  Reading  Time Taken  Comments

 

 Blood Pressure  117/76  2019 11:26 AM CDT  

 

 Pulse  90  2019 11:26 AM CDT  

 

 Temperature  -  -  

 

 Respiratory Rate  19  2019 11:26 AM CDT  

 

 Oxygen Saturation  97%  2019 11:26 AM CDT  

 

 Inhaled Oxygen Concentration  -  -  

 

 Weight  107 kg (235 lb 12.8 oz)  2019 11:26 AM CDT  

 

 Height  193 cm (6' 4")  2019 11:26 AM CDT  

 

 Body Mass Index  28.7  2019 11:26 AM CDT  



documented in this encounter



Progress Notes

Bekah Dennis - 2019 11:30 AM CDTReason for Clinic Visit: The 
patient comes to clinic today for a review of sleep study results.



Chief Complaints: The patient reports: excessive daytime sleepiness, fatigue, 
snoring, witnessed apneas.



History of Present Illness: The usual bed time is 9 p.m. and wake up time is 5:
30-6 a.m. The patientdoes take intentional naps during the day. The patient 
does not suffer from irresistible sleep attacks during the day. The patient 
does not experience sudden loss of muscle tone when emotional or excited. The 
patient does not report vivid dream-like images and loss of muscle tone when 
falling asleep and upon awakening.



Marmaduke Sleepiness Scale score: 10 (0-24).



Social History:  The patient does not smoke cigarettes. The patient 
occasionally drinks alcoholic beverages. Caffeinated beverages consumption: 1-2 
per day.



Family History: The family history is positive for snoring in blood relatives.



Physical Examination: 1) Vital signs: as noted above. 2) General: the patient 
is pleasant,  well developed, and in NAD. 3) Skin: there are no rashes, edema, 
or abnormal pigmentation. 4) Head: there areno skull deformities or 
pathological facial asymmetry. 5) Eyes: pupils are equal, round, and reactive 
to light; extraocular movements are conjugate and unrestricted, without 
strabismus or nystagmus. 6)ENT: oropharynx reveals low set soft palate and 
elongated uvula; the patient displays a good sniff through both the right and 
left nostril. 7) Neck: there are no distended veins or enlarged lymph nodes. 8) 
Back: straight, spine - without pathological curvatures. 9) Bilateral lower 
extremities are without edema. 10) Neurological - patient is alert, oriented 
and answers questions appropriately.



Review of Systems:

 1)Respiratory: positive for snoring and witnessed apneas; 2)Cardiovascular: 
positive for hypertension; 3)Endocrine/Metabolic: positive for dyslipidemia; 4)
Digestive: negative for abnormalities; 5)Urinary: positive for nocturia; 6)
Skeletal: no skeletal abnormalities detected; 7)Muscular: negative for muscular 
abnormalities; 8)Nervous: positive for hypersomnia; 9)Integumentary: no visible 
or reported skin or hair abnormalities; 10)Reproductive: no reproductive 
abnormalities noted; 11)Immune/Lymphatic/Allergy: positive for respiratory 
allergies.



Sleep Study Results: The HST on 19 revealed respiratory disturbance index 
of 37.8 events per hour of total sleep (normal &lt;5/hr) with a minimum oxygen 
saturation by pulse oximetry of 84%.



Diagnosis: Obstructive Sleep Apnea-G47.33



Recommendations and Patient Education:

The medical condition (Obstructive Sleep Apnea Syndrome) was discussed with the 
patient and the possible consequences of untreated sleep apnea regarding 
quality of sleep, daytime alertness, and cardiovascular complications were 
underlined.

The patient was prompted to ask questions clarifying the subject of sleep apnea 
and related issues and all questions were answered in detail.

Different treatment options were discussed with the patient including Positive 
Airway Pressure (PAP), ENT surgery, Mandibular Advancement Device, and weight 
reduction.

The effectiveness, success rates, and side effects of these different treatment 
options were compared and analyzed.

The patient prefers PAP treatment trial at this time and I have therefore 
scheduled for a PAP titration study.

Additional time was spent discussing sleep hygiene including: regular bedtime 
and wake-up times; enough sleep hours; going to bed only when sleepy; using bed 
for the sole purpose of sleeping; avoidanceof: 1) caffeinated and alcoholic 
beverages, 2) strenuous cognitive activity, or 3) heavy meals in the evening.

The patient was instructed to contact us in case of any further questions or 
concerns.



I will follow up with the patient to review the results of the sleep study.

Electronically signed by Bekah Dennis at 2019 11:51 AM 
CDTdocumented in this encounter



Plan of Treatment







 Date  Type  Specialty  Care Team  Description

 

 2019  Office Visit  Cardiology  Bobbi Orr MD



  



       [address] [place] [address]



  



       [phone] (Fax)  









 Name  Type  Priority  Associated Diagnoses  Order Schedule

 

 SLEEP STUDY, ATTENDED  PROCEDURES  Routine  Obstructive sleep apnea  Ordered: 
2019



       of adult  









 Health Maintenance  Due Date  Last Done  Comments

 

 HEPATITIS C (HCV) SCREEN  1955    

 

 DTaP,Tdap,and Td Vaccines (1 -  1975    



 Tdap)      

 

 COLONOSCOPY  2006    

 

 Zoster Recombinant Vaccine  2006    



 (SHINGRIX) (1 of 2)      

 

 LUNG CANCER SCREEN: Recommended  2011    



 for age 55-80 with 30 + pack year      



 history      

 

 INFLUENZA VACCINE  2019    

 

 PNEUMOCOCCAL 0-64 YEARS COMBINED  Aged Out    No longer eligible based on



 SERIES      patient's age to complete this



       topic



documented as of this encounter



Results

Not on filedocumented in this encounter



Visit Diagnoses







 Diagnosis

 

 Obstructive sleep apnea of adult - Primary







 Obstructive sleep apnea (adult) (pediatric)



documented in this encounter



Insurance







 Payer  Benefit Plan  Subscriber ID  Effective Dates  Phone  Address  Type



   / Group          

 

 [facility]  [id number]  2019-Radha  [phone]  [address]  
PPO/POS



 [facility]  [address]  [id number]



  



           [place]  



           [address]  









 Guarantor Name  Account Type  Relation to  Date of  Phone  Billing



     Patient  Birth    Address

 

 Shekhar Griffin  Personal/Family  Self  1955  [phone]  [address]



         (Home)



  [facility]







         [phone]  [place],



         ([place])  [place] [address]



documented as of this encounter

## 2019-09-11 NOTE — XMS REPORT
Summary of Care

 Created on:2019



Patient:Shekhar Griffin

Sex:Male

:1955

External Reference #:[id number]





Demographics







 Address  [address] [place] [address]

 

 Mobile Phone  [phone]

 

 Home Phone  [phone]

 

 Phone  [phone]

 

 Home Phone  [phone]

 

 Email Address  [email or web address]

 

 Preferred Language  English

 

 Marital Status  

 

 Holiness Affiliation  Unknown

 

 Race  White

 

 Ethnic Group  Not  or 









Author







 Organization  [facility]

 

 Address  [address] [place] [address]









Support







 Name  Relationship  Address  Phone

 

 Gaby Mehta  Unavailable  [address] [facility]  [phone]



     [place] [address]  









Care Team Providers







 Name  Role  Phone

 

 ThuyClark barr  Primary Care Provider  Unavailable









Reason for Visit







 Reason  Comments

 

 Follow-up  3mo

 

 Pre-op Clearance  Rotator Cuff Sx







Encounter Details







 Date  Type  Department  Care Team  Description

 

 2019  Office Visit  [facility]  Bobbi Orr MD



  Chest pain, unspecified type (Primary Dx);



     Cardiology- [address]  Preop cardiovascular exam;



     [address] [place] [address]



  STEVEN (obstructive sleep apnea)



     [address], [address]



  [address]



  



     [place] [address]



  



     [id number] [phone] (Fax)  







Allergies

No Known Allergiesdocumented as of this encounter (statuses as of 2019)



Medications







 Medication  Sig  Dispensed  Refills  Start Date  End Date  Status

 

 vit A/vit C/vit  Take  by    0      Active



 E/zinc/copper  mouth daily.          



 (PRESERVISION AREDS            



 ORAL)            

 

 simvastatin 40 mg  Take 40 mg by    0      Active



 tablet  mouth at          



   bedtime.          

 

 triamterene-hydrochlo  Take 1  30 capsule  2  2019    Active



 rothiazide (DYAZIDE)  capsule by          



 37.5-25 mg per  mouth every          



 capsule  morning.          

 

 amLODIPine 10 mg  Take 1 tablet    0  2019    Active



 tablet  by mouth          



   every          



   morning.          

 

 lisinopril 40 mg  Take 1 tablet  30 tablet  2  2019    Active



 tabletIndications:  by mouth          



 Essential  every          



 hypertension  morning.          

 

 folic  Take  by    0      Discontinued



 acid/multivit-min/lut  mouth daily.        9  



 ein (CENTRUM SILVER            



 ORAL)            

 

 acetaminophen/diphenh  Take  by    0      Discontinued



 ydramine (TYLENOL PM  mouth as        9  



 ORAL)  needed.          

 

 Bifidobacterium  Take  by    0      Discontinued



 infantis (ALIGN ORAL)  mouth daily.        9  

 

 sucralfate 1 gram  Take 3 g by    0      Discontinued



 tablet  mouth before        9  



   meals and at          



   bedtime.          

 

 BENZONATATE ORAL  Take  by    0      Discontinued



   mouth 3        9  



   (three) times          



   daily.          



documented as of this encounter (statuses as of 2019)



Active Problems

Not on filedocumented as of this encounter (statuses as of 2019)



Social History







 Tobacco Use  Types  Packs/Day  Years Used  Date

 

 Former Smoker        









 Smokeless Tobacco: Former User      









 Alcohol Use  Drinks/Week  oz/Week  Comments

 

 Yes      social









 Sex Assigned at Birth  Date Recorded

 

 Not on file  









 Job Start Date  Occupation  Industry

 

 Not on file  Not on file  Not on file









 Travel History  Travel Start  Travel End









 No recent travel history available.



documented as of this encounter



Last Filed Vital Signs







 Vital Sign  Reading  Time Taken  Comments

 

 Blood Pressure  108/72  2019 10:33 AM  



     CDT  

 

 Pulse  83  2019 10:33 AM  



     CDT  

 

 Temperature  -  -  

 

 Respiratory Rate  20  2019 10:33 AM  



     CDT  

 

 Oxygen Saturation  97%  2019 10:33 AM  



     CDT  

 

 Inhaled Oxygen Concentration  -  -  

 

 Weight  106.9 kg (235 lb 9.6 oz)  2019 10:33 AM  



     CDT  

 

 Height  190.5 cm (6' 3")  2019 10:33 AM  



     CDT  

 

 Body Mass Index  29.45  2019 10:33 AM  



     CDT  



documented in this encounter



Progress Notes

Bobbi Orr MD - 2019 10:20 AM CDTFormatting of this note might be 
different from the original.

CARDIOLOGY CLINIC NOTE

2019

Reason for Referral/Presenting Complaint: chest pain



PCP: Clark Daley



History of Present Illness:

Shekhar Griffin is a 63 years old male with history of HTN, HLD. He is here 
for chest pain.

It is precordial exploding sensation, associated with SOB. It only occurs when 
he is exerting and burps at the same time. Feels that he cannot get the gas 
out. Sometimes he wakes up burping. Occasionalleft sided arm pain. Has a slimy 
taste with nausea. Barium test normal. Underwent GI endoscopy. BP has been 
high. He snores.

Going to have rotator cuff repair.

Stated that when working outside in hot whether, he gets abdominal pressure, 
then he feels that he cannot burp fast enough to get rid of the gas. It 
accumulates and pushes the chest pain. If he gets inside and drinks something 
cool he will then burp which will relieve the chest pressure.



Review of Systems:

General: (-) fever, (-) chills, (-) weight change, (-) dizziness, (-) fatigue

Skin: (-) rash

HEENT: (-) headache, (-) change in vision

Neck: (-) difficulty swallowing

Heme: negative

Resp: (-) cough, (-) dyspnea on exertion

Cardio: (+) chest pain, (-) palpitations, (-) syncope

GI: (-) vomiting, (-) diarrhea

: negative

Endo: (-) diabetes, (-) thyroid disease

Neuro: (-) numbness, (-) tingling, (-) weakness

Back: (-) pain

ZANDER: (-) muscle pain, (-) claudication

Psych: (-) anxiety, (-) depression



Past Medical History:

No past medical history on file.

Current Medications:

Current Outpatient Medications

Medication Sig Dispense Refill

 lisinopril 40 mg tablet Take 1 tablet by mouth every morning. 30 tablet 2

 amLODIPine 10 mg tablet Take 1 tablet by mouth every morning.

 triamterene-hydrochlorothiazide (DYAZIDE) 37.5-25 mg per capsule Take 1 
capsule by mouth every morning. 30 capsule 2

 simvastatin 40 mg tablet Take 40 mg by mouth at bedtime.

 vit A/vit C/vit E/zinc/copper (PRESERVISION AREDS ORAL) Take  by mouth 
daily.



No current facility-administered medications for this visit.



Social History:

Social History



Socioeconomic History

 Marital status: 

  Spouse name: Not on file

 Number of children: Not on file

 Years of education: Not on file

 Highest education level: Not on file

Occupational History

 Not on file

Social Needs

 Financial resource strain: Not on file

 Food insecurity:

  Worry: Not on file

  Inability: Not on file

 Transportation needs:

  Medical: Not on file

  Non-medical: Not on file

Tobacco Use

 Smoking status: Former Smoker

 Smokeless tobacco: Former User

Substance and Sexual Activity

 Alcohol use: Yes

  Comment: social

 Drug use: No

 Sexual activity: Not on file

Lifestyle

 Physical activity:

  Days per week: Not on file

  Minutes per session: Not on file

 Stress: Not on file

Relationships

 Social connections:

  Talks on phone: Not on file

  Gets together: Not on file

  Attends Yazidi service: Not on file

  Active member of club or organization: Not on file

  Attends meetings of clubs or organizations: Not on file

  Relationship status: Not on file

 Intimate partner violence:

  Fear of current or ex partner: Not on file

  Emotionally abused: Not on file

  Physically abused: Not on file

  Forced sexual activity: Not on file

Other Topics Concern

 Not on file

Social History Narrative

 Not on file



Family History

Family History

Problem Relation Age of Onset

 CHF (congestive heart failure) Father

 MI (myocardial infarction) Father 68

      of MI

 Heart Mother

     pacemaker



Physical Examination:

/72 (BP Location: Left arm, Patient Position: Sitting, BP CUFF SIZE: 
Adult Large)  | Pulse 83| Resp 20  | Ht 6' 3" (1.905 m)  | Wt 235 lb 9.6 oz (
106.9 kg)  | SpO2 97%  | BMI 29.45 kg/m

Constitutional: alert and oriented x 3 (person, place and date/time); no 
apparent distress

ENT: normocephalic atraumatic, supple, no lymphadenopathy, no bruits, no JVD

Lungs: clear to auscultation bilaterally

Cardiovascular: S1, S2 normal, regular; no murmurs, rubs or gallops

GI: soft; non-tender; non-distended; normoactive bowel sounds

: not examined

Musculoskeletal: Extremities: no clubbing, cyanosis, or edema

Skin: no rashes

Neuro: no focal deficits



Cardiovascular testing:

EKG: Sinus bradycardia, HR 54 bpm, minimal LVH



Assessment/Plan:

  ICD-10-CM ICD-9-CM

1. Chest pain, unspecified type R07.9 786.50

2. Preop cardiovascular exam Z01.810 V72.81



Chest pain--Mostly dyspepsia. Nuclear stress test showed no major concerns, 
possibly some artifact without clearcut ischemia. However we are open to 
getting a definitive diagnosis with [facility] if necessary. Low to intermediate 
cardiac risk for rotator cuff repair.

HTN--Will continue amlodipine and lisinopril. Low salt diet. Daily BP log. BP 
control is good now.

STEVEN--severe STEVEN. Referred to sleep clinic.

Patient was counseled for lifestyle modifications including: diet, exercise and 
weight loss



Bobbi Orr MD, FACC, [place]

, Division of Cardiology

[facility]



Electronically signed by Bobbi Orr MD at 2019 11:43 AM CDTdocumented 
in this encounter



Plan of Treatment







 Date  Type  Specialty  Care Team  Description

 

 2019  Office Visit  Pulmonary Disease  Bekah Dennis T



  



       [address] [place] [address] Dr



  



       [address]



  



       [place], [place] [address] [phone] (Fax)  

 

 2020  Office Visit  Cardiology  Bobbi Orr MD



  



       [address] [facility]



  



       [address]



  



       [place] [address] [phone] (Fax)  









 Health Maintenance  Due Date  Last Done  Comments

 

 HEPATITIS C (HCV) SCREEN  1955    

 

 DTaP,Tdap,and Td Vaccines (1 -  1975    



 Tdap)      

 

 COLONOSCOPY  2006    

 

 Zoster Recombinant Vaccine  2006    



 (SHINGRIX) (1 of 2)      

 

 LUNG CANCER SCREEN: Recommended  2011    



 for age 55-80 with 30 + pack year      



 history      

 

 INFLUENZA VACCINE (#1)  2019    

 

 PNEUMOCOCCAL 0-64 YEARS COMBINED  Aged Out    No longer eligible based on



 SERIES      patient's age to complete this



       topic



documented as of this encounter



Results

Not on filedocumented in this encounter



Visit Diagnoses







 Diagnosis

 

 Chest pain, unspecified type - Primary

 

 Preop cardiovascular exam







 Pre-operative cardiovascular examination

 

 STEVEN (obstructive sleep apnea)







 Obstructive sleep apnea (adult) (pediatric)



documented in this encounter



Insurance







 Payer  Benefit Plan  Subscriber ID  Effective Dates  Phone  Address  Type



   / Group          

 

 [facility]  [id number]  2019-Radha  [phone]  [address]



 [place]      [id number]



  



           [place]  



           [address]  









 Guarantor Name  Account Type  Relation to  Date of  Phone  Billing



     Patient  Birth    Address

 

 Shekhar Griffin  Personal/Family  Self  1955  [phone]  [address]



         (Home)



  [place]







         [phone]  [place],



         ([place])  [place] [address]



documented as of this encounter

## 2019-09-11 NOTE — XMS REPORT
Summary of Care

 Created on:2019



Patient:Shekhar Griffin

Sex:Male

:1955

External Reference #:[id number]





Demographics







 Address  [address] [place] [address]

 

 Mobile Phone  [phone]

 

 Home Phone  [phone]

 

 Phone  [phone]

 

 Home Phone  [phone]

 

 Email Address  [email or web address]

 

 Preferred Language  English

 

 Marital Status  

 

 Advent Affiliation  Unknown

 

 Race  White

 

 Ethnic Group  Not  or 









Author







 Organization  [facility] [employer]

 

 Address  [address] [place] [address]









Support







 Name  Relationship  Address  Phone

 

 Gaby Mehta  Unavailable  [address] [facility]  [phone]



     [place] [address]  









Care Team Providers







 Name  Role  Phone

 

 ThuyEdouardju  Primary Care Provider  Unavailable









Reason for Referral

Radiology Services (Routine)





 Status  Reason  Specialty  Diagnoses /  Referred By  Referred To



       Procedures  Contact  Contact

 

 Closed    Diagnostic  Diagnoses



Chest pain, unspecified type



R07.9 (ICD-10-CM) - Chest pain, unspecified type  Bobbi Orr,  



     Radiology  



Procedures



NM MYOCARDIUM PERFUSION STRESS AND REST



CHG MYOCARDIAL SPECT MULTIPLE STUDIES



[id number] - NM MYOCARDIUM PERFUSION STRESS AND REST



61040 - CHG MYOCARDIAL SPECT MULTIPLE STUDIES  MD



  



         [address] [place] [address]



  



         [address]



  



         [place]  



         [address]



  



         Phone:  



         [phone]



  



         Fax:  



         [phone]  





 (Routine)





 Status  Reason  Specialty  Diagnoses / Procedures  Referred By Contact  
Referred To Contact

 

 Closed    Cardiology  Diagnoses



Chest pain, unspecified type  Bobbi Orr MD



  



       



Procedures



ECHO ROUTINE W/DOPPLER COLOR Preferred Location: [facility]  [address] [place] [address]



  



         [address]



  



         [place] [address]



  



         Phone: [phone]



  



         Fax: [phone]  









Reason for Visit







 Reason  Comments

 

 New Patient  Establish Care/Needing GI work up Cardiac Clearance







Encounter Details







 Date  Type  Department  Care Team  Description

 

 2019  Office Visit  [facility]  Bobbi Orr MD



  Chest pain, unspecified type (Primary Dx);



     Cardiology- [address]  Essential hypertension;



     [address] [place] [address], [address]



  [address]



  



     [place] [address]



  



     [address]



  [phone] (Fax)  







Allergies

No Known Allergiesdocumented as of this encounter (statuses as of 2019)



Medications







 Medication  Sig  Dispensed  Refills  Start Date  End Date  Status

 

 folic  Take  by    0      Active



 acid/multivit-min/lut  mouth daily.          



 ein (CENTRUM SILVER            



 ORAL)            

 

 acetaminophen/diphenh  Take  by    0      Active



 ydramine (TYLENOL PM  mouth as          



 ORAL)  needed.          

 

 Bifidobacterium  Take  by    0      Active



 infantis (ALIGN ORAL)  mouth daily.          

 

 vit A/vit C/vit  Take  by    0      Active



 E/zinc/copper  mouth daily.          



 (PRESERVISION AREDS            



 ORAL)            

 

 simvastatin 40 mg  Take 40 mg    0      Active



 tablet  by mouth at          



   bedtime.          

 

 sucralfate 1 gram  Take 3 g by    0      Active



 tablet  mouth before          



   meals and at          



   bedtime.          

 

 BENZONATATE ORAL  Take  by    0      Active



   mouth 3          



   (three)          



   times daily.          

 

 lisinopril 20 mg  Take 20 mg    0    2019  Discontinued



 tablet  by mouth          



   daily.          

 

 amLODIPine 10 mg  Take 10 mg    0    2019  Discontinued



 tablet  by mouth          



   daily.          

 

 omeprazole 20 mg  Take 20 mg    0    2019  Discontinued



 capsule  by mouth          



   daily.          

 

 lisinopril 20 mg  Take 1  60 tablet  1  2019  Discontinued



 tabletIndications:  tablet by          



 Essential  mouth 2          



 hypertension  (two) times          



   daily.          



documented as of this encounter (statuses as of 2019)



Active Problems

Not on filedocumented as of this encounter (statuses as of 2019)



Social History







 Tobacco Use  Types  Packs/Day  Years Used  Date

 

 Former Smoker        









 Smokeless Tobacco: Former User      









 Alcohol Use  Drinks/Week  oz/Week  Comments

 

 Yes      social









 Sex Assigned at Birth  Date Recorded

 

 Not on file  









 Job Start Date  Occupation  Industry

 

 Not on file  Not on file  Not on file









 Travel History  Travel Start  Travel End









 No recent travel history available.



documented as of this encounter



Last Filed Vital Signs







 Vital Sign  Reading  Time Taken  Comments

 

 Blood Pressure  158/91  2019  9:45 AM  



     CDT  

 

 Pulse  60  2019  9:45 AM  



     CDT  

 

 Temperature  -  -  

 

 Respiratory Rate  20  2019  9:45 AM  



     CDT  

 

 Oxygen Saturation  99%  2019  9:45 AM  



     CDT  

 

 Inhaled Oxygen Concentration  -  -  

 

 Weight  105.7 kg (233 lb 1.6 oz)  2019  9:45 AM  



     CDT  

 

 Height  193 cm (6' 4")  2019  9:45 AM  



     CDT  

 

 Body Mass Index  28.37  2019  9:45 AM  



     CDT  



documented in this encounter



Progress Notes

Bobbi Orr MD - 2019  9:40 AM CDTFormatting of this note might be 
different from the original.

CARDIOLOGY CLINIC NOTE

5/3/2019

Reason for Referral/Presenting Complaint: chest pain



PCP: Clark Daley



History of Present Illness:

Shekhar Griffin is a 63 years old male with history of HTN, HLD. He is here 
for chest pain.

It is precordial exploding sensation, associated with SOB. It only occurs when 
he is exerting and burps at the same time. Feels that he cannot get the gas 
out. Sometimes he wakes up burping. Occasionalleft sided arm pain. Has a slimy 
taste with nausea. Barium test normal. Plan on GI endoscopy. BP hasbeen high. 
He snores.



Review of Systems:

General: (-) fever, (-) chills, (-) weight change, (-) dizziness, (-) fatigue

Skin: (-) rash

HEENT: (-) headache, (-) change in vision

Neck: (-) difficulty swallowing

Heme: negative

Resp: (-) cough, (-) dyspnea on exertion

Cardio: (+) chest pain, (-) palpitations, (-) syncope

GI: (-) vomiting, (-) diarrhea

: negative

Endo: (-) diabetes, (-) thyroid disease

Neuro: (-) numbness, (-) tingling, (-) weakness

Back: (-) pain

ZANDER: (-) muscle pain, (-) claudication

Psych: (-) anxiety, (-) depression



Past Medical History:

History reviewed. No pertinent past medical history.

Current Medications:

Current Outpatient Prescriptions

Medication Sig Dispense Refill

 acetaminophen/diphenhydramine (TYLENOL PM ORAL) Take  by mouth as needed.

 amLODIPine 10 mg tablet Take 10 mg by mouth daily.

 BENZONATATE ORAL Take  by mouth 3 (three) times daily.

 Bifidobacterium infantis (ALIGN ORAL) Take  by mouth daily.

 folic acid/multivit-min/lutein (CENTRUM SILVER ORAL) Take  by mouth daily.

 omeprazole 20 mg capsule Take 20 mg by mouth daily.

 simvastatin 40 mg tablet Take 40 mg by mouth at bedtime.

 sucralfate 1 gram tablet Take 3 g by mouth before meals and at bedtime.

 vit A/vit C/vit E/zinc/copper (PRESERVISION AREDS ORAL) Take  by mouth 
daily.

 lisinopril 40 mg tablet Take 1 tablet by mouth daily. 30 tablet 2



No current facility-administered medications for this visit.



Social History:

Social History



Social History

 Marital status: 

  Spouse name: N/A

 Number of children: N/A

 Years of education: N/A



Social History Main Topics

 Smoking status: Former Smoker

 Smokeless tobacco: Former User

 Alcohol use Yes

   Comment: social

 Drug use: No

 Sexual activity: Not on file



Other Topics Concern

 Not on file



Social History Narrative

 No narrative on file



Family History

Family History

Problem Relation Age of Onset

 CHF (congestive heart failure) Father

 MI (myocardial infarction) Father 68

      of MI

 Heart Mother

     pacemaker



Physical Examination:

BP (!) 158/91 (BP Location: Right arm, Patient Position: Sitting, BP CUFF SIZE: 
Adult Large)  | Pulse 60  | Resp 20  | Ht 6' 4" (1.93 m)  | Wt 233 lb 1.6 oz (
105.7 kg)  | SpO2 99%  | BMI 28.37 kg/m

Constitutional: alert and oriented x 3 (person, place and date/time); no 
apparent distress

ENT: normocephalic atraumatic, supple, no lymphadenopathy, no bruits, no JVD

Lungs: clear to auscultation bilaterally

Cardiovascular: S1, S2 normal, regular; no murmurs, rubs or gallops

GI: soft; non-tender; non-distended; normoactive bowel sounds

: not examined

Musculoskeletal: Extremities: no clubbing, cyanosis, or edema

Skin: no rashes

Neuro: no focal deficits



Cardiovascular testing:

EKG: Sinus bradycardia, HR 54 bpm, minimal LVH



Assessment/Plan:

  ICD-10-CM ICD-9-CM

1. Chest pain, unspecified type R07.9 786.50

2. Essential hypertension I10 401.9

3. Snores R06.83 786.09



Chest pain--Not entirely classical. It occurs with exertion and burping at the 
same time. Some GI features. But with exertional component. Will get an 
exercise nuclear stress test to assess ischemia, and ECHO to rule out 
structural heart disease.

HTN--Will continue amlodipine and lisinopril. Low salt diet. Daily BP log.

Snoring--Will get a sleep study to assess STEVEN.

Patient was counseled for lifestyle modifications including: diet, exercise and 
weight loss



Thank you for allowing us to participate in the care of your patient. Please 
feel free to contact usfor any questions or if we can be of further assistance.

=================================

Addendum---2019---nuclear stress test showed possible inferior wall artifact
, poor quality. Recommend GI evaluation first. Then follow up for reassessment.

=================================

Addendum---2019---Low to intermediate cardiac risk for rotator cuff repair.



Bobbi Orr MD, FAC, LENNOX

, Division of Cardiology

[facility]



Electronically signed by Bobbi Orr MD at 2019  4:04 PM CDTdocumented 
in this encounter



Plan of Treatment







 Date  Type  Specialty  Care Team  Description

 

 2019  Office Visit  Cardiology  Bobbi Orr MD



  



       [address] [place] [address]



  



       [address]



  



       [place] [address]



  



       [phone] (Fax)  

 

 2019  Technician Visit  Sleep Disorder  [facility] [address] [place] [address]



[phone] (Fax)  



     Diagnostic  



1, [facility] Sleep Lab Bed  

 

 2019  Office Visit  Pulmonary Disease  [facility] [address] [place] [address]



  



       [phone] (Fax)  









 Name  Type  Priority  Associated Diagnoses  Order Schedule

 

 EKG-12 LEAD ROUTINE  HEART STATION  Routine  Essential hypertension  Ordered: 
2019









 Health Maintenance  Due Date  Last Done  Comments

 

 HEPATITIS C (HCV) SCREEN  1955    

 

 DTaP,Tdap,and Td Vaccines (1 -  1975    



 Tdap)      

 

 COLONOSCOPY  2006    

 

 Zoster Recombinant Vaccine  2006    



 (SHINGRIX) (1 of 2)      

 

 LUNG CANCER SCREEN: Recommended  2011    



 for age 55-80 with 30 + pack year      



 history      

 

 INFLUENZA VACCINE  2019    

 

 PNEUMOCOCCAL 0-64 YEARS COMBINED  Aged Out    No longer eligible based on



 SERIES      patient's age to complete this



       topic



documented as of this encounter



Procedures







 Procedure Name  Priority  Date/Time  Associated Diagnosis  Comments

 

 EKG-12 LEAD  Routine  2019  9:53 AM CDT    



documented in this encounter



Results

NM MYOCARDIUM PERFUSION STRESS AND REST (2019 12:52 PM CDT)





 Specimen

 

 









 Impressions  Performed At

 

 1.The patient's electrocardiogram is nonischemic.



  PACS/VR/DOSE



 2.The patient's clinical response is asymptomatic for angina. 



  



 3.Overall left ventricular systolic function is mildly reduced. The  



 left



  



 ventricular ejection fraction is calculated to be 47% at rest and 48% at



  



 stress.



  



 4.SPECT imaging reveals small size, mild degree reversible defects  



 in



  



 apical inferior, mid inferior wall segments. This could be attenuation



  



 artifact Vs true defect. Clinical correlation recommended. 



  



  



  



  



  



 I was present for the stress portion of the study. 



  



  



  



  



  



  



  



  



  



   









 Narrative  Performed At

 

 * * * * * * * * ORIGINAL REPORT * * * * * * * * 



  PACS/VR/DOSE



 [facility]



  



  



  



 [facility] Stress Test Report



  



  



  



 PROCEDURE:



  



 After obtaining witnessed informed consent, patient underwent a  



 Regadenoson



  



 nuclear stress test using a one-day protocol. The patient was  



 administered



  



 0.4 mg. Regadenoson over 10 seconds intravenously. Myocardial perfusion



  



 SPECT imaging was performed at rest after the intravenous injection of  



 15.6



  



  mCi of Technetium 99m Tetrofosmin. During the stress portion of the  



 test



  



 42.3 mCi of Technetium 99m Tetrofosmin was injected intravenously at 20



  



 seconds after the Regadenoson infusion at peak pharmacologic effect. The



  



 stress gated SPECT study was acquired. Both stress and rest images were



  



 acquired with patient being supine. Images were processed according to  



 ASNC



  



 guidelines. Short, horizontal long, long axis slices, raw data cines,  



 polar



  



 plot, and wall motion analysis were reviewed. 



  



  



  



 FINDINGS:



  



 *During the Regadenoson administration, no symptoms were noted. 



  



 *Please refer ECG report for full details. 



  



 *The overall technical quality of the study is good.



  



 *Raw cine data reveals no significant normality. 



  



 *SPECT imaging reveals shows small size, mild degree reduced uptake  



 of



  



 radiopharmaceutical agents apical inferior, mid inferior wall segments  



 in



  



 stress images but not in rest images. 



  



 *Rest of the SPECT imaging reveals shows normal uptake of



  



 radiopharmaceutical agents in all of wall segments in both stress and  



 rest



  



 images. 



  



 *Post-stress LV end-diastolic volume is 106 ml and LV end-systolic  



 volume



  



 is 55 ml. 



  



 *The left ventricle ejection fraction is calculated to be 47 % at  



 rest



  



 and 48 % at stress.



  



 *Regional wall motion analysis of the left ventricle reveals mild  



 global



  



 hypokinesis. 



  



 *TID: 0.95



  



  



  



   









 Procedure Note

 

 [facility], Radiant Results Inft User - 2019  9:42 PM CDT



* * * * * * * * ORIGINAL REPORT * * * * * * * *



 [facility]



 



 Nuclear Lexiscan Stress Test Report



 



 PROCEDURE:



 After obtaining witnessed informed consent, patient underwent a Regadenoson



 nuclear stress test using a one-day protocol. The patient was administered



 0.4 mg. Regadenoson over 10 seconds intravenously. Myocardial perfusion



 SPECT imaging was performed at rest after the intravenous injection of 15.6



  mCi of Technetium 99m Tetrofosmin. During the stress portion of the test



 42.3 mCi of Technetium 99m Tetrofosmin was injected intravenously at 20



 seconds after the Regadenoson infusion at peak pharmacologic effect. The



 stress gated SPECT study was acquired. Both stress and rest images were



 acquired with patient being supine. Images were processed according to ASNC



 guidelines. Short, horizontal long, long axis slices, raw data cines, polar



 plot, and wall motion analysis were reviewed.



 



 FINDINGS:



 *  During the Regadenoson administration, no symptoms were noted.



 *  Please refer ECG report for full details.



 *  The overall technical quality of the study is good.



 *  Raw cine data reveals no significant normality.



 *  SPECT imaging reveals shows small size, mild degree reduced uptake of



 radiopharmaceutical agents apical inferior, mid inferior wall segments in



 stress images but not in rest images.



 *  Rest of the SPECT imaging reveals shows normal uptake of



 radiopharmaceutical agents in all of wall segments in both stress and rest



 images.



 *  Post-stress LV end-diastolic volume is 106 ml and LV end-systolic volume



 is 55 ml.



 *  The left ventricle ejection fraction is calculated to be 47 % at rest



 and 48 % at stress.



 *  Regional wall motion analysis of the left ventricle reveals mild global



 hypokinesis.



 *  TID: 0.95



 



 



 IMPRESSION



 1.  The patient's electrocardiogram is nonischemic.



 2.  The patient's clinical response is asymptomatic for angina.



 3.  Overall left ventricular systolic function is mildly reduced. The left



 ventricular ejection fraction is calculated to be 47% at rest and 48% at



 stress.



 4.  SPECT imaging reveals small size, mild degree reversible defects in



 apical inferior, mid inferior wall segments. This could be attenuation



 artifact Vs true defect. Clinical correlation recommended.



 



 



 I was present for the stress portion of the study.









 Performing Organization  Address  City/State/Zipcode  Phone Number

 

 PACS/VR/DOSE      



ECHO ROUTINE W/DOPPLER COLOR Preferred Location: [facility] (2019  3:36 PM CDT)





 Specimen

 

 









 Performing Organization  Address  City/State/[facility]  Phone Number

 

 ECHO      



documented in this encounter



Visit Diagnoses







 Diagnosis

 

 Chest pain, unspecified type - Primary

 

 Essential hypertension







 Unspecified essential hypertension

 

 Snores







 Other dyspnea and respiratory abnormality



documented in this encounter



Insurance







 Payer  Benefit Plan  Subscriber ID  Effective Dates  Phone  Address  Type



   / Group          

 

 [employer] [facility]  [id number]  2019-Radha  [phone]  [address]



 [place]      [address]  [id number]



  



           [place]  



           [address]  









 Guarantor Name  Account Type  Relation to  Date of  Phone  Billing



     Patient  Birth    Address

 

 Shekhar Griffin  Personal/Family  Self  1955  [phone]  [address]



         (Home)



  [place]







         [phone]  [place],



         (Work)  [place] [address]



documented as of this encounter

## 2019-09-11 NOTE — XMS REPORT
Summary of Care

 Created on:2019



Patient:Shekhar Griffin

Sex:Male

:1955

External Reference #:[id number]





Demographics







 Address  [address] [place] [address]

 

 Mobile Phone  [phone]

 

 Home Phone  [phone]

 

 Phone  [phone]

 

 Home Phone  [phone]

 

 Email Address  [email or web address]

 

 Preferred Language  English

 

 Marital Status  

 

 Synagogue Affiliation  Unknown

 

 Race  White

 

 Ethnic Group  Not  or 









Author







 Organization  [facility]

 

 Address  [address] [place] [address]









Support







 Name  Relationship  Address  Phone

 

 Gaby Mehta  Unavailable  [address] [facility]  [phone]



     [place] [address]  









Care Team Providers







 Name  Role  Phone

 

 Clark Daley  Primary Care Provider  Unavailable









Reason for Referral

 (Routine)





 Status  Reason  Specialty  Diagnoses /  Referred By  Referred To



       Procedures  Contact  Contact

 

 New Request    Sleep Disorder  Diagnoses



Obstructive sleep apnea of adult  Pending sale to [facility],  



     Diagnostic  



Procedures



SLEEP STUDY, ATTENDED  [address]  



         [address]



  



         [place] [address]



  



         Phone:  



         [phone]



  



         Fax:  



         [phone]  









Reason for Visit







 Reason  Comments

 

 New Patient  

 

 Results  HST



 (Routine)





 Status  Reason  Specialty  Diagnoses /  Referred By  Referred To



       Procedures  Contact  Contact

 

 Closed    Sleep Disorder  Diagnoses



STEVEN (obstructive sleep apnea)  Bobbi Orr MD



  



     Diagnostic  



Procedures



CONSULT/REFERRAL SLEEP CLINIC ADULT PULM Preferred Location: [address]



  



         [address]



  



         [place]  



         [address]



  



         Phone:  



         [phone]



  



         Fax: [phone]  









Encounter Details







 Date  Type  Department  Care Team  Description

 

 2019  Office Visit  [facility]  Bekah Dennis 



  Obstructive sleep



     Pulmonary Clinic



  [address] [place] [address] 



  apnea of [address]



  (Primary Dx)



     [address]



  [place] [address]



  



     [place]  [phone]



  



     [phone] (Fax)  



     [phone]    







Allergies

No Known Allergiesdocumented as of this encounter (statuses as of 2019)



Medications







 Medication  Sig  Dispensed  Refills  Start Date  End Date  Status

 

 folic  Take  by mouth    0      Active



 acid/multivit-min/lutein  daily.          



 (CENTRUM SILVER ORAL)            

 

 acetaminophen/diphenhydr  Take  by mouth    0      Active



 amine (TYLENOL PM ORAL)  as needed.          

 

 Bifidobacterium infantis  Take  by mouth    0      Active



 (ALIGN ORAL)  daily.          

 

 vit A/vit C/vit  Take  by mouth    0      Active



 E/zinc/copper  daily.          



 (PRESERVISION AREDS            



 ORAL)            

 

 simvastatin 40 mg tablet  Take 40 mg by    0      Active



   mouth at          



   bedtime.          

 

 sucralfate 1 gram tablet  Take 3 g by    0      Active



   mouth before          



   meals and at          



   bedtime.          

 

 BENZONATATE ORAL  Take  by mouth    0      Active



   3 (three) times          



   daily.          

 

 triamterene-hydrochlorot  Take 1 capsule  30 capsule  2  2019    Active



 hiazide (DYAZIDE)  by mouth every          



 37.5-25 mg per capsule  morning.          

 

 amLODIPine 10 mg tablet  Take 1 tablet    0  2019    Active



   by mouth every          



   morning.          

 

 lisinopril 40 mg  Take 1 tablet  30 tablet  2  2019    Active



 tabletIndications:  by mouth every          



 Essential hypertension  morning.          



documented as of this encounter (statuses as of 2019)



Active Problems

Not on filedocumented as of this encounter (statuses as of 2019)



Social History







 Tobacco Use  Types  Packs/Day  Years Used  Date

 

 Former Smoker        









 Smokeless Tobacco: Former User      









 Alcohol Use  Drinks/Week  oz/Week  Comments

 

 Yes      social









 Sex Assigned at Birth  Date Recorded

 

 Not on file  









 Job Start Date  Occupation  Industry

 

 Not on file  Not on file  Not on file









 Travel History  Travel Start  Travel End









 No recent travel history available.



documented as of this encounter



Last Filed Vital Signs







 Vital Sign  Reading  Time Taken  Comments

 

 Blood Pressure  117/76  2019 11:26 AM CDT  

 

 Pulse  90  2019 11:26 AM CDT  

 

 Temperature  -  -  

 

 Respiratory Rate  19  2019 11:26 AM CDT  

 

 Oxygen Saturation  97%  2019 11:26 AM CDT  

 

 Inhaled Oxygen Concentration  -  -  

 

 Weight  107 kg (235 lb 12.8 oz)  2019 11:26 AM CDT  

 

 Height  193 cm (6' 4")  2019 11:26 AM CDT  

 

 Body Mass Index  28.7  2019 11:26 AM CDT  



documented in this encounter



Progress Notes

Bekah Dennis - 2019 11:30 AM CDTReason for Clinic Visit: The 
patient comes to clinic today for a review of sleep study results.



Chief Complaints: The patient reports: excessive daytime sleepiness, fatigue, 
snoring, witnessed apneas.



History of Present Illness: The usual bed time is 9 p.m. and wake up time is 5:
30-6 a.m. The patientdoes take intentional naps during the day. The patient 
does not suffer from irresistible sleep attacks during the day. The patient 
does not experience sudden loss of muscle tone when emotional or excited. The 
patient does not report vivid dream-like images and loss of muscle tone when 
falling asleep and upon awakening.



Castana Sleepiness Scale score: 10 (0-24).



Social History:  The patient does not smoke cigarettes. The patient 
occasionally drinks alcoholic beverages. Caffeinated beverages consumption: 1-2 
per day.



Family History: The family history is positive for snoring in blood relatives.



Physical Examination: 1) Vital signs: as noted above. 2) General: the patient 
is pleasant,  well developed, and in NAD. 3) Skin: there are no rashes, edema, 
or abnormal pigmentation. 4) Head: there areno skull deformities or 
pathological facial asymmetry. 5) Eyes: pupils are equal, round, and reactive 
to light; extraocular movements are conjugate and unrestricted, without 
strabismus or nystagmus. 6)ENT: oropharynx reveals low set soft palate and 
elongated uvula; the patient displays a good sniff through both the right and 
left nostril. 7) Neck: there are no distended veins or enlarged lymph nodes. 8) 
Back: straight, spine - without pathological curvatures. 9) Bilateral lower 
extremities are without edema. 10) Neurological - patient is alert, oriented 
and answers questions appropriately.



Review of Systems:

 1)Respiratory: positive for snoring and witnessed apneas; 2)Cardiovascular: 
positive for hypertension; 3)Endocrine/Metabolic: positive for dyslipidemia; 4)
Digestive: negative for abnormalities; 5)Urinary: positive for nocturia; 6)
Skeletal: no skeletal abnormalities detected; 7)Muscular: negative for muscular 
abnormalities; 8)Nervous: positive for hypersomnia; 9)Integumentary: no visible 
or reported skin or hair abnormalities; 10)Reproductive: no reproductive 
abnormalities noted; 11)Immune/Lymphatic/Allergy: positive for respiratory 
allergies.



Sleep Study Results: The HST on 19 revealed respiratory disturbance index 
of 37.8 events per hour of total sleep (normal &lt;5/hr) with a minimum oxygen 
saturation by pulse oximetry of 84%.



Diagnosis: Obstructive Sleep Apnea-G47.33



Recommendations and Patient Education:

The medical condition (Obstructive Sleep Apnea Syndrome) was discussed with the 
patient and the possible consequences of untreated sleep apnea regarding 
quality of sleep, daytime alertness, and cardiovascular complications were 
underlined.

The patient was prompted to ask questions clarifying the subject of sleep apnea 
and related issues and all questions were answered in detail.

Different treatment options were discussed with the patient including Positive 
Airway Pressure (PAP), ENT surgery, Mandibular Advancement Device, and weight 
reduction.

The effectiveness, success rates, and side effects of these different treatment 
options were compared and analyzed.

The patient prefers PAP treatment trial at this time and I have therefore 
scheduled for a PAP titration study.

Additional time was spent discussing sleep hygiene including: regular bedtime 
and wake-up times; enough sleep hours; going to bed only when sleepy; using bed 
for the sole purpose of sleeping; avoidanceof: 1) caffeinated and alcoholic 
beverages, 2) strenuous cognitive activity, or 3) heavy meals in the evening.

The patient was instructed to contact us in case of any further questions or 
concerns.



I will follow up with the patient to review the results of the sleep study.

Electronically signed by Bekah Dennis at 2019 11:51 AM 
CDTdocumented in this encounter



Plan of Treatment







 Date  Type  Specialty  Care Team  Description

 

 2019  Office Visit  Cardiology  Bobbi Orr MD



  



       [address] [place] [address]



  



       [phone] (Fax)  









 Name  Type  Priority  Associated Diagnoses  Order Schedule

 

 SLEEP STUDY, ATTENDED  PROCEDURES  Routine  Obstructive sleep apnea  Ordered: 
2019



       of adult  









 Health Maintenance  Due Date  Last Done  Comments

 

 HEPATITIS C (HCV) SCREEN  1955    

 

 DTaP,Tdap,and Td Vaccines (1 -  1975    



 Tdap)      

 

 COLONOSCOPY  2006    

 

 Zoster Recombinant Vaccine  2006    



 (SHINGRIX) (1 of 2)      

 

 LUNG CANCER SCREEN: Recommended  2011    



 for age 55-80 with 30 + pack year      



 history      

 

 INFLUENZA VACCINE  2019    

 

 PNEUMOCOCCAL 0-64 YEARS COMBINED  Aged Out    No longer eligible based on



 SERIES      patient's age to complete this



       topic



documented as of this encounter



Results

Not on filedocumented in this encounter



Visit Diagnoses







 Diagnosis

 

 Obstructive sleep apnea of adult - Primary







 Obstructive sleep apnea (adult) (pediatric)



documented in this encounter



Insurance







 Payer  Benefit Plan  Subscriber ID  Effective Dates  Phone  Address  Type



   / Group          

 

 [facility]  [id number]  2019-Radha  [phone]  [address]



 [facility]  [id number]



  



           [place]  



           [address]  









 Guarantor Name  Account Type  Relation to  Date of  Phone  Billing



     Patient  Birth    Address

 

 Shekhar Griffin  Personal/Family  Self  1955  [phone]  [address]



         (Home)



  [facility]







         [phone]  [place],



         ([place])  [place] [address]



documented as of this encounter

## 2019-09-11 NOTE — XMS REPORT
Continuity of Care Document

 Created on:2015



Patient:ISAURA POP

Sex:Male

:1955

External Reference #:[id number]





Demographics







 Address  [address] [place] [address]

 

 Phone  [phone]

 

 Preferred Language  Unknown

 

 Marital Status  

 

 Jewish Affiliation  Unknown

 

 Race  Unknown

 

 Ethnic Group  Unknown









Author







 Organization  [facility]









Care Team Providers







 Name  Role  Phone

 

 MD Leonid, Gumaro  Unavailable  Unavailable









Insurance Providers







 Payer name  Policy type / Coverage  Policy ID  Covered party ID  Policy Hernadez



   type      

 

 AETNA PPO PRIMARY        



 [id number]        

 

 AETNA PPO PRIMARY        



 [id number]        

 

 AETNA (PPO)        

 

 AETNA (PPO)        

 

 AETNA (PPO)        







Encounters







 Encounter  Performer  Location  Date

 

 Lab Report  Gumaro Jaquez MD  [facility]  



     Med  







Allergies, Adverse Reactions, Alerts







 Type  Substance  Reaction  Status

 

 Drug allergy  PCN    Active







Problems







 Problem  Effective Dates  Problem Status

 

 HYPERCHOLESTEROLEMIA    Active

 

 HTN    Active







Procedures







 Date  Description  Comments

 

 Aug 09, 2012  smoking status  never smoker

 

 Oct 14, 2014  smoking status  Never smoker







Medications







 Medication  Instructions  Start Date  Status

 

 SIMVASTATIN 20 MG TABS  1 po qd  Aug 09, 2012  Active

 

 LISINOPRIL-HYDROCHLOROTHIAZIDE 10-12.5 MG TABS  1 po qd  2013  Active







Vital Signs







 Date  Description  Test  Result

 

 Aug 09, 2012  height E&M - 8302-2  HEIGHT  74 in

 

 Aug 09, 2012  weight E&M - 3141-9  WEIGHT  230 lb

 

 Aug 09, 2012  pulse rate E&M - 8867-4  PULSE RATE  80 /min

 

 Aug 09, 2012  blood pressure, systolic - 8480-6  BP SYSTOLIC  150 mm Hg

 

 Aug 09, 2012  blood pressure, diastolic - 8462-4  BP DIASTOLIC  78 mm Hg

 

 2013  weight E&M - 3141-9  WEIGHT  220 lb

 

 2013  pulse rate E&M - 8867-4  PULSE RATE  88 /min

 

 2013  blood pressure, systolic - 8480-6  BP SYSTOLIC  120 mm Hg

 

 2013  blood pressure, diastolic - 8462-4  BP DIASTOLIC  80 mm Hg

 

 Aug 22, 2013  weight E&M - 3141-9  WEIGHT  233 lb

 

 Aug 22, 2013  pulse rate E&M - 8867-4  PULSE RATE  88 /min

 

 Aug 22, 2013  blood pressure, systolic - 8480-6  BP SYSTOLIC  130 mm Hg

 

 Aug 22, 2013  blood pressure, diastolic - 8462-4  BP DIASTOLIC  84 mm Hg

 

 2014  weight E&M - 3141-9  WEIGHT  231 lb

 

 2014  temperature E&M  TEMPERATURE  98 deg f

 

 2014  pulse rate E&M - 8867-4  PULSE RATE  72 /min

 

 2014  blood pressure, systolic - 8480-6  BP SYSTOLIC  143 mm Hg

 

 2014  blood pressure, diastolic - 8462-4  BP DIASTOLIC  87 mm Hg

 

 Oct 14, 2014  weight E&M - 3141-9  WEIGHT  228 lb

 

 Oct 14, 2014  temperature E&M  TEMPERATURE  97 deg f

 

 Oct 14, 2014  pulse rate E&M - 8867-4  PULSE RATE  57 /min

 

 Oct 14, 2014  blood pressure, systolic - 8480-6  BP SYSTOLIC  166 mm Hg

 

 Oct 14, 2014  blood pressure, diastolic - 8462-4  BP DIASTOLIC  89 mm Hg







Results







 Date  Description  Test Name  Value  Reference  Interpretation  Status

 

 ,  hemoglobin, blood  HGB  14.3 g/dL  12.3-17.3    



 2015,  hematocrit, blood  HCT  42.9 %  36.7-50.5    



 2015            

 

 May 12,  prostate specific  PSA  3.02 ng/mL      



   antigen          

 

 Aug 15,  prostate specific  PSA  1.31 ng/mL      



   antigen          

 

 May 03,  prostate specific  PSA  1.2 ng/mL      



   antigen          

 

 Oct 13,  sodium, serum  SODIUM  140 mmol/L  135-143    



 2014            

 

 Oct 13,  potassium, serum  POTASSIUM  4.4 mmol/L  3.3-5.0    



 2014            

 

 Oct 13,  albumin, serum  ALBUMIN  4.3 g/dL  3.5-5.0    



 2014            

 

 Oct 13,  calcium, serum  CALCIUM  9.4 mg/dL  8.6-9.8    



 2014            

 

 Oct 13,  creatinine, serum  CREATININE  0.73 mg/dL  0.46-1.20    



 2014            

 

 Oct 13,  urea nitrogen, blood  BUN  15 mg/dL  10-22    



 2014            

 

 Oct 13,  alkaline phosphatase,  ALK PHOS  80 U/L  32-96    



   serum          

 

 Oct 13,  aspartate  SGOT (AST)  27 U/L  10-42    



   aminotransferase          



   (SGOT), serum          

 

 Oct 13,  alanine  SGPT (ALT)  29 U/L  -43    



   aminotransferase          



   (SGPT), serum          

 

 Oct 13,  cholesterol, serum  CHOLESTEROL  259 mg/dl  120-200  High  



 2014            

 

 Oct 13,  HDL cholesterol,  HDL  49 mg/dl  26-62    



 2014  serum          

 

 Oct 13,  LDL cholesterol,  LDL  145 mg/dl  0-130  High  



   serum          

 

 Oct 13,  prostate specific  PSA  1.16 ng/mL  0-4.0    



   antigen          

 

 Oct 13,  thyroid stimulating  TSH  1.48  0.34-5.60    



   hormone, serum    uIU/mL      

 

 ,  sodium, serum  SODIUM  138 mmol/L  135-143    



 2015,  potassium, serum  POTASSIUM  4.4 mmol/L  3.3-5.0    



 2015,  albumin, serum  ALBUMIN  4.4 g/dL  3.5-5.0    



 2015,  calcium, serum  CALCIUM  9.4 mg/dL  8.6-9.8    



 2015,  creatinine, serum  CREATININE  0.82 mg/dL  0.46-1.20    



 2015,  urea nitrogen, blood  BUN  17 mg/dL  10-22    



 2015,  alkaline phosphatase,  ALK PHOS  86 U/L  32-96    



   serum          

 

 ,  aspartate  SGOT (AST)  22 U/L  10-42    



   aminotransferase          



   (SGOT), serum          

 

 ,  alanine  SGPT (ALT)  17 U/L  11-43    



   aminotransferase          



   (SGPT), serum          

 

 ,  cholesterol, serum  CHOLESTEROL  228 mg/dl  120-200  High  



 2015,  HDL cholesterol,  HDL  51 mg/dl  26-62    



   serum          

 

 ,  LDL cholesterol,  LDL  132 mg/dl  0-130  High  



 2015  serum          

 

 ,  international  INR  null null    Normal  



 2013  normalized ratio          



   (INR)

## 2019-09-11 NOTE — XMS REPORT
Continuity of Care Document

 Created on:2014



Patient:ISAURA POP

Sex:Male

:1955

External Reference #:[id number]





Demographics







 Address  [address] [place] [address]

 

 Phone  [phone]

 

 Preferred Language  Unknown

 

 Marital Status  

 

 Baptism Affiliation  Unknown

 

 Race  Unknown

 

 Ethnic Group  Unknown









Author







 Organization  [facility]









Care Team Providers







 Name  Role  Phone

 

 MD Leonid, Gumaro  Unavailable  Unavailable









Insurance Providers







 Payer name  Policy type / Coverage  Policy ID  Covered party ID  Policy Hernadez



   type      

 

 AETNA PPO PRIMARY        



 [id number]        

 

 AETNA PPO PRIMARY        



 [id number]        

 

 AETNA (PPO)        

 

 AETNA (PPO)        

 

 AETNA (PPO)        







Encounters







 Encounter  Performer  Location  Date

 

 Lab Report  Gumaro Jaquez MD  [facility]  Oct 13
, 2014



     Med  







Allergies, Adverse Reactions, Alerts







 Type  Substance  Reaction  Status

 

 Drug allergy  PCN    Active







Problems







 Problem  Effective Dates  Problem Status

 

 HYPERCHOLESTEROLEMIA    Active

 

 HTN    Active







Procedures







 Date  Description  Comments

 

 Aug 09, 2012  smoking status  never smoker







Medications







 Medication  Instructions  Start Date  Status

 

 SIMVASTATIN 20 MG TABS  1 po qd  Aug 09, 2012  Active

 

 LISINOPRIL-HYDROCHLOROTHIAZIDE 10-12.5 MG TABS  1 po qd  2013  Active







Vital Signs







 Date  Description  Test  Result

 

 Aug 09, 2012  height E&M - 8302-2  HEIGHT  74 in

 

 Aug 09, 2012  weight E&M - 3141-9  WEIGHT  230 lb

 

 Aug 09, 2012  pulse rate E&M - 8867-4  PULSE RATE  80 /min

 

 Aug 09, 2012  blood pressure, systolic - 8480-6  BP SYSTOLIC  150 mm Hg

 

 Aug 09, 2012  blood pressure, diastolic - 8462-4  BP DIASTOLIC  78 mm Hg

 

 2013  weight E&M - 3141-9  WEIGHT  220 lb

 

 2013  pulse rate E&M - 8867-4  PULSE RATE  88 /min

 

 2013  blood pressure, systolic - 8480-6  BP SYSTOLIC  120 mm Hg

 

 2013  blood pressure, diastolic - 8462-4  BP DIASTOLIC  80 mm Hg

 

 Aug 22, 2013  weight E&M - 3141-9  WEIGHT  233 lb

 

 Aug 22, 2013  pulse rate E&M - 8867-4  PULSE RATE  88 /min

 

 Aug 22, 2013  blood pressure, systolic - 8480-6  BP SYSTOLIC  130 mm Hg

 

 Aug 22, 2013  blood pressure, diastolic - 8462-4  BP DIASTOLIC  84 mm Hg

 

 2014  weight E&M - 3141-9  WEIGHT  231 lb

 

 2014  temperature E&M  TEMPERATURE  98 deg f

 

 2014  pulse rate E&M - 8867-4  PULSE RATE  72 /min

 

 2014  blood pressure, systolic - 8480-6  BP SYSTOLIC  143 mm Hg

 

 2014  blood pressure, diastolic - 8462-4  BP DIASTOLIC  87 mm Hg







Results







 Date  Description  Test Name  Value  Reference  Interpretation  Status

 

 May 12,  prostate specific  PSA  3.02 ng/mL      



   antigen          

 

 Aug 15,  prostate specific  PSA  1.31 ng/mL      



   antigen          

 

 May 03,  prostate specific  PSA  1.2 ng/mL      



   antigen          

 

 Oct 13,  sodium, serum  SODIUM  140 mmol/L  135-143    



 2014            

 

 Oct 13,  potassium, serum  POTASSIUM  4.4 mmol/L  3.3-5.0    



 2014            

 

 Oct 13,  albumin, serum  ALBUMIN  4.3 g/dL  3.5-5.0    



 2014            

 

 Oct 13,  calcium, serum  CALCIUM  9.4 mg/dL  8.6-9.8    



 2014            

 

 Oct 13,  creatinine, serum  CREATININE  0.73 mg/dL  0.46-1.20    



 2014            

 

 Oct 13,  urea nitrogen, blood  BUN  15 mg/dL  10-22    



 2014            

 

 Oct 13,  alkaline phosphatase,  ALK PHOS  80 U/L  32-96    



   serum          

 

 Oct 13,  aspartate  SGOT (AST)  27 U/L  10-42    



   aminotransferase          



   (SGOT), serum          

 

 Oct 13,  alanine  SGPT (ALT)  29 U/L  -43    



   aminotransferase          



   (SGPT), serum          

 

 Oct 13,  cholesterol, serum  CHOLESTEROL  259 mg/dl  120-200  High  



 2014            

 

 Oct 13,  HDL cholesterol,  HDL  49 mg/dl  -62    



   serum          

 

 Oct 13,  LDL cholesterol,  LDL  145 mg/dl  0-130  High  



   serum          

 

 Oct 13,  prostate specific  PSA  1.16 ng/mL  0-4.0    



   antigen          

 

 Oct 13,  thyroid stimulating  TSH  1.48  0.34-5.60    



   hormone, serum    uIU/mL      

 

 ,  international  INR  null null    Normal  



   normalized ratio          



   (INR)

## 2019-09-11 NOTE — XMS REPORT
Continuity of Care Document

 Created on:2015



Patient:ISAURA POP

Sex:Male

:1955

External Reference #:[id number]





Demographics







 Address  [address] [place] [address]

 

 Phone  [phone]

 

 Preferred Language  Unknown

 

 Marital Status  

 

 [employer] Affiliation  Unknown

 

 Race  Unknown

 

 Ethnic Group  Unknown









Author







 Organization  [facility]









Care Team Providers







 Name  Role  Phone

 

 MD Leonid, Gumaro  Unavailable  Unavailable









Insurance Providers







 Payer name  Policy type / Coverage  Policy ID  Covered party ID  Policy Hernadez



   type      

 

 AETNA PPO PRIMARY        



 [id number]        

 

 AETNA PPO PRIMARY        



 [id number]        

 

 AET[employer] (PPO)        

 

 AETNA (PPO)        

 

 AETNA (PPO)        







Encounters







 Encounter  Performer  Location  Date

 

 Office Visit  Gumaro Jaquez MD  [facility]  2015



     Med  







Allergies, Adverse Reactions, Alerts







 Type  Substance  Reaction  Status

 

 Drug allergy  PCN    Active







Problems







 Problem  Effective Dates  Problem Status

 

 HYPERCHOLESTEROLEMIA    Active

 

 HTN    Active







Procedures







 Date  Description  Comments

 

 Aug 09, 2012  smoking status  never smoker

 

 Oct 14, 2014  smoking status  Never smoker

 

 2015  smoking status  Never smoker







Medications







 Medication  Instructions  Start Date  Status

 

 SIMVASTATIN 20 MG TABS  1 po qd  Aug 09, 2012  Active

 

 LISINOPRIL-HYDROCHLOROTHIAZIDE 10-12.5 MG TABS  1 po qd  2013  Active

 

 AMLODIPINE BESYLATE 5 MG TABS  1 po qam  2015  Active







Vital Signs







 Date  Description  Test  Result

 

 Aug 09, 2012  height E&M - 8302-2  HEIGHT  74 in

 

 Aug 09, 2012  weight E&M - 3141-9  WEIGHT  230 lb

 

 Aug 09, 2012  pulse rate E&M - 8867-4  PULSE RATE  80 /min

 

 Aug 09, 2012  blood pressure, systolic - 8480-6  BP SYSTOLIC  150 mm Hg

 

 Aug 09, 2012  blood pressure, diastolic - 8462-4  BP DIASTOLIC  78 mm Hg

 

 2013  weight E&M - 3141-9  WEIGHT  220 lb

 

 2013  pulse rate E&M - 8867-4  PULSE RATE  88 /min

 

 2013  blood pressure, systolic - 8480-6  BP SYSTOLIC  120 mm Hg

 

 2013  blood pressure, diastolic - 8462-4  BP DIASTOLIC  80 mm Hg

 

 Aug 22, 2013  weight E&M - 3141-9  WEIGHT  233 lb

 

 Aug 22, 2013  pulse rate E&M - 8867-4  PULSE RATE  88 /min

 

 Aug 22, 2013  blood pressure, systolic - 8480-6  BP SYSTOLIC  130 mm Hg

 

 Aug 22, 2013  blood pressure, diastolic - 8462-4  BP DIASTOLIC  84 mm Hg

 

 2014  weight E&M - 3141-9  WEIGHT  231 lb

 

 2014  temperature E&M  TEMPERATURE  98 deg f

 

 2014  pulse rate E&M - 8867-4  PULSE RATE  72 /min

 

 2014  blood pressure, systolic - 8480-6  BP SYSTOLIC  143 mm Hg

 

 2014  blood pressure, diastolic - 8462-4  BP DIASTOLIC  87 mm Hg

 

 Oct 14, 2014  weight E&M - 3141-9  WEIGHT  228 lb

 

 Oct 14, 2014  temperature E&M  TEMPERATURE  97 deg f

 

 Oct 14, 2014  pulse rate E&M - 8867-4  PULSE RATE  57 /min

 

 Oct 14, 2014  blood pressure, systolic - 8480-6  BP SYSTOLIC  166 mm Hg

 

 Oct 14, 2014  blood pressure, diastolic - 8462-4  BP DIASTOLIC  89 mm Hg

 

 2015  weight E&M - 3141-9  WEIGHT  216 lb

 

 2015  temperature E&M  TEMPERATURE  97 deg f

 

 2015  pulse rate E&M - 8867-4  PULSE RATE  64 /min

 

 2015  blood pressure, systolic - 8480-6  BP SYSTOLIC  160 mm Hg

 

 2015  blood pressure, diastolic - 8462-4  BP DIASTOLIC  94 mm Hg







Results







 Date  Description  Test Name  Value  Reference  Interpretation  Status

 

 ,  hemoglobin, blood  HGB  14.3 g/dL  12.3-17.3    



 2015,  hematocrit, blood  HCT  42.9 %  36.7-50.5    



 2015            

 

 May 12,  prostate specific  PSA  3.02 ng/mL      



   antigen          

 

 Aug 15,  prostate specific  PSA  1.31 ng/mL      



   antigen          

 

 May 03,  prostate specific  PSA  1.2 ng/mL      



   antigen          

 

 Oct 13,  sodium, serum  SODIUM  140 mmol/L  135-143    



 2014            

 

 Oct 13,  potassium, serum  POTASSIUM  4.4 mmol/L  3.3-5.0    



 2014            

 

 Oct 13,  albumin, serum  ALBUMIN  4.3 g/dL  3.5-5.0    



 2014            

 

 Oct 13,  calcium, serum  CALCIUM  9.4 mg/dL  8.6-9.8    



 2014            

 

 Oct 13,  creatinine, serum  CREATININE  0.73 mg/dL  0.46-1.20    



 2014            

 

 Oct 13,  urea nitrogen, blood  BUN  15 mg/dL  10-22    



 2014            

 

 Oct 13,  alkaline phosphatase,  ALK PHOS  80 U/L  2014  serum          

 

 Oct 13,  aspartate  SGOT (AST)  27 U/L  10-42    



 2014  aminotransferase          



   (SGOT), serum          

 

 Oct 13,  alanine  SGPT (ALT)  29 U/L  2014  aminotransferase          



   (SGPT), serum          

 

 Oct 13,  cholesterol, serum  CHOLESTEROL  259 mg/dl  120-200  High  



 2014            

 

 Oct 13,  HDL cholesterol,  HDL  49 mg/dl  -2014  serum          

 

 Oct 13,  LDL cholesterol,  LDL  145 mg/dl  0-130  High  



   serum          

 

 Oct 13,  prostate specific  PSA  1.16 ng/mL  0-4.0    



   antigen          

 

 Oct 13,  thyroid stimulating  TSH  1.48  0.34-5.60    



   hormone, serum    uIU/mL      

 

 ,  sodium, serum  SODIUM  138 mmol/L  135-143    



 2015,  potassium, serum  POTASSIUM  4.4 mmol/L  3.3-5.0    



 2015,  albumin, serum  ALBUMIN  4.4 g/dL  3.5-5.0    



 2015,  calcium, serum  CALCIUM  9.4 mg/dL  8.6-9.8    



 2015,  creatinine, serum  CREATININE  0.82 mg/dL  0.46-1.20    



 2015,  urea nitrogen, blood  BUN  17 mg/dL  10-22    



 2015            

 

 Apr 07,  alkaline phosphatase,  ALK PHOS  86 U/L  2015  serum          

 

 ,  aspartate  SGOT (AST)  22 U/L  10-42    



 2015  aminotransferase          



   (SGOT), serum          

 

 ,  alanine  SGPT (ALT)  17 U/L  2015  aminotransferase          



   (SGPT), serum          

 

 ,  cholesterol, serum  CHOLESTEROL  228 mg/dl  120-200  High  



 2015,  HDL cholesterol,  HDL  51 mg/dl  2015  serum          

 

 ,  LDL cholesterol,  LDL  132 mg/dl  0-130  High  



 2015  serum          

 

 ,  international  INR  null null    Normal  



   normalized ratio          



   (INR)

## 2019-09-13 NOTE — OP
Date of Procedure:  09/11/2019



Surgeon:  Lane Banuelos MD



Preoperative Diagnoses:  

1.Right shoulder rotator cuff tear.

2.Right shoulder SLAP tear.



Postoperative Diagnoses:  

1.Right shoulder rotator cuff tear.

2.Right shoulder SLAP tear.



Procedure Performed:  

1.Right shoulder arthroscopic rotator cuff repair.

2.Right shoulder arthroscopic SLAP tear, subscapularis tear debridement.

3.Right shoulder open subpectoral biceps tenodesis.



Anesthesia:  General endotracheal.



Fluids:  Per Anesthesia record.



Estimated Blood Loss:  10 cc.



Complications:  None.



Implants:  4.75 x 19 mm Arthrex SwiveLock and a 7 x 19 mm Arthrex Bio-Tenodesis Screw.



Indications For Procedure:  Shekhar is a 64-year-old male, presented to my clinic with signs, symptom
s, and MRI findings consistent with a right shoulder rotator cuff tear as well as a SLAP tear.  I dis
cussed with the patient at length risks and benefits associated with operative and nonoperative treat
ment.  He expressed understanding and elected to proceed with operative treatment.



Description Of Procedure:  After informed consent was obtained, the patient was identified in the pre
operative holding area.  The right upper extremity was marked.  The patient was then brought back to 
the PACU, underwent an interscalene block to the right upper extremity performed by Anesthesia.  The 
patient was then taken back to the operating room, transferred to the operating table in supine fashi
on and placed under general endotracheal anesthesia.  He was placed in a beach chair position with ex
tremities well padded.  The right upper extremity was examined.  The patient had full range of motion
 __________ his right shoulder.  Right upper extremity was then prepped and draped in usual sterile f
ashion.  A time-out was initiated.  Correct patient and procedure were confirmed and identified.  The
 patient had received his preoperative prophylactic antibiotics.  Via the posterior portal position, 
a spinal needle was introduced in the glenohumeral joint and the shoulder joint was injected with 30 
cc of normal saline.  A posterior portal was created and the arthroscope was brought in via the poste
rior portal position and diagnostic arthroscopy was performed.  A standard anterior portal was create
d and a cannula was placed.  Patient was noted to have a type 2 SLAP tear with significant fraying of
 the superior labrum.  A biceps tenotomy was performed using meniscal biter.  SLAP tear was then debr
ided using the arthroscopic shaver.  The patient was noted to have some grade 3 chondromalacia change
s over the __________ inferior aspect of the glenoid surface.  Loose chondral edges were then debride
d using the arthroscopic shaver.  Patient was noted to have small tear at the superior aspect of the 
subscapularis.  This was debrided using arthroscopic shaver.  It was noticed the patient had some sig
nificant fraying at the undersurface of supraspinatus with a noted tear on the articular surface of t
he supraspinatus __________ supraspinatus.  Lateral portal was created.  There was noted to be ______
____ full-thickness tear.  The supraspinatus tear was then debrided using arthroscopic shaver both in
side and outside of the joint.  Arthroscope was brought into the subacromial space and a subacromial 
bursectomy was performed.  Patient was noted to have some significant hyperemia of this supraspinatus
 __________ the area corresponding to the undersurface tearing of the supraspinatus was identified __
________ the devitalized tissue was debrided and it was consistent with a full-thickness tear.  An Ar
threx SutureTape was then placed an inverted horizontal mattress suture using a FastPass Scorpion sut
ure passer.  It was placed on the anterior __________ posterior aspect of the tear.  It was then used
 to reduce the rotator cuff tear under greater tuberosity.  A single 4.75 mm x 19 mm Arthrex SwiveLoc
k was placed __________ reduction of the tear under greater tuberosity.  Remaining sutures were then 
cut.  There was no significant fraying of the coracoacromial ligament.  The arthroscopic instruments 
were then removed.  __________ underneath it.  Approximately a 3 cm incision __________ was made just
 medial to the pec insertion on the proximal humerus.  Dissection was taken down to fascia, which was
 split, divided.  The pec tendon was then gently retracted superiorly.  The long head of the biceps w
as identified, brought out through the incision, measured approximately 3 cm from the musculotendinou
s junction and whipstitched using the __________.  The remaining tendon was then cut.  A guide pin wa
s then placed within bicipital groove followed by 7.5 mm reamer and a unicortical tunnel was made.  A
 __________ Arthrex Bio-Tenodesis screw was then used __________ screw within the tunnel and there wa
s good fixation noted.  The wounds were then irrigated thoroughly with normal saline.  Subcutaneous t
issue was approximated using a 2-0 Vicryl.  Skin was approximated using a 3-0 Monocryl.  Sterile dres
sings were applied.  Patient awakened and transferred to PACU in stable condition after a shoulder im
mobilizer was put into position.



Postoperative Plan:  The patient will be nonweightbearing __________.  He will follow up in my clinic
 next week for wound check.  We will begin rotator cuff rehab __________ 3 weeks postoperatively.





TRACIE/JUDY

DD:  09/12/2019 07:31:31Voice ID:  [id number]

DT:  09/12/2019 19:14:35Report ID:  [id number]

## 2021-01-25 LAB
BUN BLD-MCNC: 21 MG/DL (ref 7–18)
GLUCOSE SERPLBLD-MCNC: 90 MG/DL (ref 74–106)
HCT VFR BLD CALC: 36.4 % (ref 39.6–49)
INR BLD: 1.06
LYMPHOCYTES # SPEC AUTO: 1 K/UL (ref 0.7–4.9)
PMV BLD: 7.7 FL (ref 7.6–11.3)
POTASSIUM SERPL-SCNC: 3.9 MMOL/L (ref 3.5–5.1)
RBC # BLD: 4.03 M/UL (ref 4.33–5.43)

## 2021-01-29 ENCOUNTER — HOSPITAL ENCOUNTER (OUTPATIENT)
Dept: HOSPITAL 97 - OR | Age: 66
Discharge: HOME | End: 2021-01-29
Attending: ORTHOPAEDIC SURGERY
Payer: COMMERCIAL

## 2021-01-29 VITALS — SYSTOLIC BLOOD PRESSURE: 150 MMHG | DIASTOLIC BLOOD PRESSURE: 86 MMHG

## 2021-01-29 VITALS — TEMPERATURE: 97.1 F | OXYGEN SATURATION: 99 %

## 2021-01-29 DIAGNOSIS — G56.02: Primary | ICD-10-CM

## 2021-01-29 DIAGNOSIS — Z20.822: ICD-10-CM

## 2021-01-29 PROCEDURE — 80048 BASIC METABOLIC PNL TOTAL CA: CPT

## 2021-01-29 PROCEDURE — 36415 COLL VENOUS BLD VENIPUNCTURE: CPT

## 2021-01-29 PROCEDURE — 85025 COMPLETE CBC W/AUTO DIFF WBC: CPT

## 2021-01-29 PROCEDURE — 64721 CARPAL TUNNEL SURGERY: CPT

## 2021-01-29 PROCEDURE — 85610 PROTHROMBIN TIME: CPT

## 2021-01-29 PROCEDURE — 93005 ELECTROCARDIOGRAM TRACING: CPT

## 2021-01-29 PROCEDURE — 01N50ZZ RELEASE MEDIAN NERVE, OPEN APPROACH: ICD-10-PCS

## 2021-01-29 PROCEDURE — 85730 THROMBOPLASTIN TIME PARTIAL: CPT

## 2021-01-29 PROCEDURE — 71046 X-RAY EXAM CHEST 2 VIEWS: CPT

## 2021-01-29 RX ADMIN — BUPIVACAINE HYDROCHLORIDE ONE ML: 2.5 INJECTION, SOLUTION EPIDURAL; INFILTRATION; INTRACAUDAL at 10:31

## 2021-01-29 RX ADMIN — BUPIVACAINE HYDROCHLORIDE ONE ML: 2.5 INJECTION, SOLUTION EPIDURAL; INFILTRATION; INTRACAUDAL at 10:45

## 2021-01-29 NOTE — XMS REPORT
Summary of Care

                           Created on:2021



Patient:Shekhar Griffin

Sex:Male

:1955

External Reference #:[id number]





Demographics







                          Address                   [address] [place] [address]

 

                          Mobile Phone              [phone]

 

                          Home Phone                [phone]

 

                          Phone                     [phone]

 

                          Email Address             [email or web address]

 

                          Preferred Language        English

 

                          Marital Status            

 

                          Quaker Affiliation     Unknown

 

                          Race                      White

 

                          Ethnic Group              Not  or 









Author







                          Organization              [facility]

 

                          Address                   [address] [place] [address]









Support







                Name            Relationship    Address         Phone

 

                Gaby Mehta Unavailable     [address] [facility] [phone]

[phone]



                                                [place] [address] 









Care Team Providers







                    Name                Role                Phone

 

                    Clark Daley        Primary Care Provider Unavailable









Reason for Visit







                          Reason                    Comments

 

                          Pre-op Clearance          [facility]







Encounter Details







             Date         Type         Department   Care Team    Description

 

                2021      Telephone       [facility]     Bobbi Orr M D



                                        Pre-op Clearance (The



                                                            Cardiology- [address]         [facility])



                                                [address]



                                        



                                                            [address]



                                        [address]



                                        



                                                [place], 

[address]



                                        



                                                            [address]



                                        [phone] (Fax) 







Allergies

No Known Allergiesdocumented as of this encounter (statuses as of 2021)



Medications







          Medication Sig       Dispensed Refills   Start Date End Date  Status

 

          vit A/vit C/vit Take  by mouth           0                            

 Active



          E/zinc/copper daily.                                            



          (PRESERVISION AREDS                                                   



          ORAL)                                                       

 

          nitroglycerin 0.4 mg Place 1 tablet 1 Bottle  1         2019    

       Active



          sublingual tablet under the tongue                                    

     



                    every  5 (five)                                         



                    minutes as                                         



                    needed for Chest                                         



                    pain (if pain                                         



                    not relieved                                         



                    after 3 doses                                         



                    (15 min) go to                                         



                    an ER).                                           

 

          psyllium husk Take  by mouth.           0                             

Active



          (METAMUCIL ORAL)                                                   

 

          aspirin 81 mg chewable Take 1 tablet by 30 tablet 11        2020

           Active



          tabletIndications: mouth daily.                                       

  



          History of                                                   



          percutaneous coronary                                                 

  



          intervention                                                   

 

          amLODIPine 5 mg Take 1 tablet by 30 tablet 0         2020       

    Active



          tabletIndications: mouth every                                        

 



          History of morning.                                          



          percutaneous coronary                                                 

  



          intervention                                                   

 

          atorvastatin 80 mg Take 1 tablet by 30 tablet 3         2020    

       Active



          tabletIndications: mouth at                                          



          History of bedtime.                                          



          percutaneous coronary                                                 

  



          intervention                                                   

 

          LISINOPRIL 40 mg TAKE 1 TABLET BY 90 tablet 2         2020      

     Active



          tabletIndications: MOUTH EVERY DAY                                    

     



          Essential hypertension IN THE MORNING                                 

        

 

          TRIAMTERENE-HYDROCHLOR TAKE 1 CAPSULE 90 capsule 1         2020 

          Active



          OTHIAZIDE 37.5-25 mg BY MOUTH EVERY                                   

      



          per capsule DAY IN THE                                         



                    MORNING                                           

 

          VASCEPA 1 gram capsule TAKE 2 CAPSULES 360 capsule 2         10/14/202

0           Active



                    BY MOUTH 2 (TWO)                                         



                    TIMES DAILY.                                         

 

          ticagrelor 90 mg Take 1 tablet by 180 tablet 3         2020     

      Active



          tabletIndications: mouth 2 (two)                                      

   



          History of times daily.                                         



          percutaneous coronary                                                 

  



          intervention                                                   



documented as of this encounter (statuses as of 2021)



Active Problems







                          Problem                   Noted Date

 

                          Coronary artery disease involving native coronary howard

ry of native heart 

2020



                          without angina pectoris   

 

                          History of percutaneous coronary intervention 20

19



documented as of this encounter (statuses as of 2021)



Immunizations







                    Name                Administration Dates Next Due

 

                    Influenza Virus Vaccine 2019          

 

                    Influenza Virus Vaccine Quad IM 3+ YRS 2020          



documented as of this encounter



Social History







             Tobacco Use  Types        Packs/Day    Years Used   Date

 

             Former Smoker                                        









                Smokeless Tobacco: Former User                                 









                Alcohol Use     Drinks/Week     oz/Week         Comments

 

                Yes                                             social









                    Education           Answer              Date Recorded

 

                    What is the highest level of school you have High school gra

duate 2019



                    completed or the highest degree you have received?          

           









                    Financial Resource Strain Answer              Date Recorded

 

                    How hard is it for you to pay for the very basics like Not h

yury at all     

2019



                    food, housing, medical care, and heating?                   

  









                    Food Insecurity     Answer              Date Recorded

 

                    Within the past 12 months, you worried that your food would 

Never true          

2019



                    run out before you got money to buy more.                   

  

 

                    Within the past 12 months, the food you bought just didn't N

ever true          2019



                    last and you didn't have money to get more.                 

    









                    Transportation Needs Answer              Date Recorded

 

                    In the past 12 months, has lack of transportation kept you f

rom No                  2019



                    medical appointments or from getting medications?           

          

 

                    In the past 12 months, has lack of transportation kept you f

rom No                  2019



                    meetings, work, or getting things needed for daily living?  

                   









                          Sex Assigned at Birth     Date Recorded

 

                          Not on file               









                    COVID-19 Exposure   Response            Date Recorded

 

                    In the last month, have you been in contact with No / Unsure

         2020  9:46 AM

CST



                    someone who was confirmed or suspected to have              

       



                    Coronavirus / COVID-19?                     



documented as of this encounter



Last Filed Vital Signs

Not on filedocumented in this encounter



Miscellaneous Notes

Telephone Encounter - Nestor Perkins MA - 2021 11:29 AM CSTDr. Kirby 
received clearance form from Dr. Banuelos. Completed faxed back , confirmation 
received. Will scan into EPIC.Electronically signed by Nestor Perkins MA at 
2021 11:30 AM CSTdocumented in this encounter



Plan of Treatment







             Date         Type         Specialty    Care Team    Description

 

                2021      Office Visit    Cardiology      Bobbi Orr M D



                                        



                                                                [address] [place] [address]

[address] [phone] (Fax) 

 

                2021      Office Visit    Pulmonary Disease Emilie Siddiqi, 





                                        



                                                                [address] [place] [address] 

[id number] [phone] (Fax) 

 

                12/15/2021      Office Visit    Pulmonary Disease Kiley Dennis MD



                                        



                                                                [address] [facility] [address]



                                        



                                                                [place], 

[address] [phone] (Fax) 









                Health Maintenance Due Date        Last Done       Comments

 

                HEPATITIS C (HCV) SCREEN 1955                      

 

                DTaP,Tdap,and Td Vaccines (1 - 1975                      



                Tdap)                                           

 

                COLON CANCER SCREENING ANNUAL 2006                      



                FIT/FOBT                                        

 

                COLON CANCER SCREENING FIT DNA 2006                      



                EVERY 3 YEARS                                   

 

                COLON CANCER SCREENING 2006                      



                SIGMOIDOSCOPY EVERY 5 YEARS                                 

 

                COLONOSCOPY     2006                      

 

                Colorectal Cancer Screening 2006                      

 

                Zoster Recombinant Vaccine 2006                      



                (SHINGRIX) (1 of 2)                                 

 

                LUNG CANCER SCREEN: 2011                      



                Recommended for age 55-80 with                                 



                30 + pack year history                                 

 

                Depression Screening 2021      

 

                INFLUENZA VACCINE Completed       2020,     



                                                2019      

 

                PNEUMOCOCCAL 0-64 YEARS Aged Out                        No longe

r eligible based



                COMBINED SERIES                                 on patient's age

 to



                                                                complete this to

pic



documented as of this encounter



Implants







          Implanted Type      Area       Device Identifier Shelf Exp

iration Model / 

Serial



                                                            Date      / Lot

 

          Plate     PLATE     Arm                                     



documented as of this encounter



Results

Not on filedocumented in this encounter



Insurance







          Payer     Benefit Plan Subscriber ID Effective Dates Phone     Address

   Type



                    / Group                                           

 

          [employer] OF   [facility] [id number]-Radha [phone] [id number]

[address]   [address]



           [place]                            [id number]



                                        



                                                            [place], [place] 



                                                            [address]     



documented as of this encounter

## 2021-01-29 NOTE — XMS REPORT
Continuity of Care Document

                           Created on:2021



Patient:ISAURA POP

Sex:Male

:1955

External Reference #:[id number]





Demographics







                          Address                   [address] [place] [address]

 

                          Home Phone                [phone]

 

                          Work Phone                [phone]

 

                          Mobile Phone              [phone]

 

                          Email Address             [email or web address]

 

                          Preferred Language        en

 

                          Marital Status            Unknown

 

                          [employer]aritan Affiliation     Unknown

 

                          Race                      Unknown

 

                          Additional Race(s)        Other Race



                                                    Unavailable



                                                    White



                                                    White/



                                                    Unavailable

 

                          Ethnic Group              Not  or 









Author







                          Organization              [facility]

t

 

                          Address                   [address] [place] [address]

 

                          Phone                     [phone]









Support







                Name            Relationship    Address         Phone

 

                Gaby       Unavailable     [address] [facility] [phone]



                                                [place] [address] 

 

                GABY       Unavailable     [address] [facility] [phone]



                                                [place] [address] 

 

                Janine        Spouse          [address] [facility] [phone]



                                                [place] [address] 









Care Team Providers







                    Name                Role                Phone

 

                    Kirby DAVILA              Attending Clinician [phone]

 

                    Doctor Unassigned,  Name Attending Clinician Unavailable

 

                    Jeannie DAVILA,  T     Attending Clinician [phone]

 

                    Barron Read     Attending Clinician [phone]

 

                    Shamar Jaquez Attending Clinician [phone]









Problems







       Condition Condition Condition Status Onset  Resolution Last   Treating Co

mments 

Source



       Name   Details Category        Date   Date   Treatment Clinician        



                                                 Date                 

 

       Benign        Problem Active               2020-10-24               [facility]

ia



       hypertensi                                    00:49:08               l



       on       Benign                                                  [place]



       (disorder) hypertensi                                                  



              on                                                      



              (disorder)                                                  



                                                                      



                                                                      



               Active                                                  



                                                                      



                                                                      



                                                                      



                                                                      



              Problem                                                  



                                                                      



                                                                      



              10/24/2020                                                  



                                                                      



                                                                      



               Data                                                   



              migrated                                                  



              from [facility] [employer] [facility]                                                  



              on                                                      



              5/30/15.                                                  



                                                                      



                                                                      



               Medical                                                  



              Group,Misc                                                  



              her Neuro                                                  



                                                                      



                                                                      

 

       Carpal        Problem Active               2020-10-24               [facility]

ia



       tunnel                                    00:49:08               l



       syndrome   Carpal                                                  Gil

n



       (disorder) tunnel                                                  



              syndrome                                                  



              (disorder)                                                  



                                                                      



                                                                      



               Active                                                  



                                                                      



                                                                      



                                                                      



                                                                      



              Problem                                                  



                                                                      



                                                                      



              10/24/2020                                                  



                                                                      



                                                                      



                                                                      



                                                                      



                 [facility]                                                  



              Neuro                                                   



                                                                      



                                                                      

 

       Hyperchole        Problem Active               2020-10-24               M

emoria



       sterolemia                                    00:49:08               l



       (disorder)                                                         Gil

n



              Hyperchole                                                  



              sterolemia                                                  



              (disorder)                                                  



                                                                      



                                                                      



               Active                                                  



                                                                      



                                                                      



                                                                      



                                                                      



              Problem                                                  



                                                                      



                                                                      



              10/24/2020                                                  



                                                                      



                                                                      



               Data                                                   



              migrated                                                  



              from [facility] [employer]                                                  



              on                                                      



              5/30/15.                                                  



                                                                      



                                                                      



               Medical                                                  



              Group,Misc                                                  



              her Neuro                                                  



                                                                      



                                                                      

 

       HYPERCHOLE        Condition Active               2015              

 [facility]



       STEROLEMIA                                    08:30:42               l



                                                                      [place]



              HYPERCHOLE                                                  



              STEROLEMIA                                                  



                                                                      



                                                                      



               Active                                                  



                                                                      



                                                                      



                                                                      



                                                                      



              Condition                                                  



                                                                      



                                                                      



              2015                                                  



                                                                      



                                                                      



                                                                      



                                                                      



                                                                    



              Medical                                                  



              Group                                                   



                                                                      



                                                                      

 

       HTN           Condition Active               2015               Mem

oria



                                                 08:30:42               l



                HTN                                                   [place]



                                                                      



                                                                      



              Active                                                  



                                                                      



                                                                      



                                                                      



                                                                      



              Condition                                                  



                                                                      



                                                                      



              2015                                                  



                                                                      



                                                                      



                                                                      



                                                                      



                                                                    



              Medical                                                  



              Group                                                   



                                                                      



                                                                      







Allergies, Adverse Reactions, Alerts







       Allergy Allergy Status Severity Reaction(s) Onset  Inactive Treating Comm

ents 

Source



       Name   Type                        Date   Date   Clinician        

 

       penicill penicill Active                                     [facility]

a



       ins<sup> ins<sup>                      2-21                        l



       1</sup> 1</sup>                      06:00:                      [place]



                                          00                          

 

       PCN    PCN    Active                                     [facility]



                                          2-21                        l



                                          00:00:                      [place]



                                          00                          







Social History







           Social Habit Start Date Stop Date  Quantity   Comments   Source

 

           Social History 2018                       [facility]



                      14:10:21   14:10:21                         







Medications







       Ordered Filled Start  Stop   Current Ordering Indication Dosage Frequency

 Signature

                    Comments            Components          Source



     Medication Medication Date Date Medication? Clinician                (SIG) 

          



     Name Name                                                   

 

     lisinopril            Yes                      See            [facility]



     20 mg oral      9-13                               Instructio           l



     tablet      19:03:                               ns, # 30           [place]



               00                                 tab,           



                                                  Refill(s)           



                                                  6, TAKE 1           



                                                  TABLET BY           



                                                  MOUTH           



                                                  EVERY DAY,           



                                                  Pharmacy:           



                                                  [facility] #[id number]           

 

     lisinopril            No                       1 tablet,           Me

moria



     20 mg oral      8-16                               PO, Daily,           l



     tablet      21:10:                               0              [place]



               00                                 Refill(s)           

 

     amLODIPine            Yes                      See            [facility]



     10 mg oral      8-16                               Instructio           l



     tablet      14:55:                               ns, TAKE 1           Radha

nn



               54                                 TABLET BY           



                                                  MOUTH           



                                                  EVERY DAY,           



                                                  # 90 tab,           



                                                  3              



                                                  Refill(s),           



                                                  Pharmacy:           



                                                  [facility] #[id number]           

 

     simvastatin      -      Yes                      See            [facility]

a



     40 mg oral      8-16                               Instructio           l



     tablet      14:55:                               ns, TAKE 1           Radha

nn



               51                                 TABLET BY           



                                                  MOUTH AT           



                                                  BEDTIME, #           



                                                  90 tab, 3           



                                                  Refill(s),           



                                                  Pharmacy:           



                                                  [facility] #[id number]           

 

     lisinopril      -      No                       See            [facility]



     10 mg oral      8-16                               Instructio           l



     tablet      14:50:                               ns, TAKE 2           Radha

nn



               20                                 TABLET BY           



                                                  MOUTH           



                                                  EVERY DAY,           



                                                  # 180 tab,           



                                                  3              



                                                  Refill(s),           



                                                  Pharmacy:           



                                                  [facility] #[id number]           

 

     lisinopril            Yes                      See            [facility]



     10 mg oral      5-10                               Instructio           l



     tablet      14:46:                               ns, TAKE 2           Radha

nn



               20                                 TABLET BY           



                                                  MOUTH           



                                                  EVERY DAY,           



                                                  # 180 tab,           



                                                  3              



                                                  Refill(s),           



                                                  Pharmacy:           



                                                  [facility]           



                                                  [facility] #[id number]           

 

     simvastatin            Yes                      See            [facility]

a



     40 mg oral      4-17                               Instructio           l



     tablet      21:14:                               ns, # 90           [place]



               49                                 tab,           



                                                  Refill(s)           



                                                  3, TAKE 1           



                                                  TABLET BY           



                                                  MOUTH AT           



                                                  BEDTIME,           



                                                  Pharmacy:           



                                                  [id number]           

 

     triamcinolo            No                       40 mg,           Daniel

samuel



     ne        3-20                               Route: IM,           l



     ACETONIDE      19:59:                               ONCE,           Franklin



     40 mg/mL      00                                 Dosing           



     injectable                                         Weight           



     suspension                                         105.057,           



                                                  kg,            



                                                  (KENALOG),           



                                                  Start           



                                                  date:           



                                                  18           



                                                  14:59:00           



                                                  CDT, Stop           



                                                  date:           



                                                  18           



                                                  14:59:00           



                                                  CDT            

 

     benzonatate            No                       100 mg = 1           

[facility]



     100 mg oral      3-20                               cap, PO,           l



     capsule      19:58:                               TID, do           Alex



               00                                 not crush           



                                                  or chew, X           



                                                  10 day, #           



                                                  30 cap, 0           



                                                  Refill(s),           



                                                  Pharmacy:           



                                                  [facility]           



                                                  [facility] #[id number]           

 

     Levofloxaci            No                       500 mg = 1           

[facility]



     n 500 MG      3-20                               tab, PO,           l



     Oral Tablet      19:58:                               Q24H, X 7           H

ermann



     [Levaquin]      00                                 day, # 7           



                                                  tab, 0           



                                                  Refill(s),           



                                                  Pharmacy:           



                                                  [facility]           



                                                   #[id number]           

 

     sucralfate      2017      Yes                      1 gm = 1           Mem

oria



     1 g oral      2-06                               tab, PO,           l



     tablet      17:10:                               Before           Alex



               00                                 Meals &           



                                                  Bedtime, #           



                                                  120 tab, 1           



                                                  Refill(s),           



                                                  Pharmacy:           



                                                  [facility]           



                                                  [facility] #[id number]           

 

     omeprazole      2017      Yes                      20 mg = 1           Me

moria



     20 mg oral      2-06                               tab, PO,           l



     enteric      17:10:                               QAM, # 30           Radha

nn



     coated      00                                 tab, 2           



     tablet                                         Refill(s),           



                                                  Pharmacy:           



                                                  [facility]           



                                                  [facility] #[id number]           

 

     Ascorbic      2017      Yes                      1 tab, PO,           Mem

oria



     Acid 113 MG      2-06                               Daily, 0           l



     / Beta      16:27:                               Refill(s)           Gil

n



     Carotene      00                                                



     7160 MG /                                                        



     cuprous                                                        



     oxide 0.4                                                        



     MG /                                                        



     dl-alpha                                                        



     tocopheryl                                                        



     acetate 100                                                        



     UNT / Zinc                                                        



     Oxide 17.4                                                        



     MG Oral                                                        



     Tablet                                                        



     [PreserVisi                                                        



     on]                                                         

 

     AMLODIPINE            Yes                      1 po qam           [facility]

[facility]



     BESYLATE 5      4-14                                              l



     MG TABS      00:00:                                              [place]



               00                                                

 

     LISINOPRIL-            Yes                      1 po qd           [facility]

[facility]



     HYDROCHLORO      2-21                                              l



     THIAZIDE      00:00:                                              [place]



     10-12.5 MG      00                                                



     TABS                                                        

 

     LISINOPRIL-            Yes                      1 po qd           [facility]

[facility]



     HYDROCHLORO      2-21                                              l



     THIAZIDE      00:00:                                              [place]



     10-12.5 MG      00                                                



     TABS                                                        

 

     SIMVASTATIN            Yes                      1 po qd           [facility]

[facility]



     20 MG TABS      8-09                                              l



               00:00:                                              [place]



               00                                                

 

     SIMVASTATIN            Yes                      1 po qd           [facility]

[facility]



     20 MG TABS      8-09                                              l



               00:00:                                              [place]



               00                                                







Vital Signs







             Vital Name   Observation Time Observation Value Comments     Source

 

             Weight       2018 14:09:00                           [facility]

 [place]

 

             BMI Calculated 2018 14:09:00                           [facility]

[facility] [place]

 

             Height       2018 14:09:00 190.5 cm                  [facility]

 [place]

 

             Heart Rate   2018 14:09:00                           [facility]

 [place]

 

             Systolic (mm Hg) 2018 14:09:00                           [facility]

[facility] [place]

 

             Diastolic (mm Hg) 2018 14:09:00                           [facility]

[facility]

 

             Weight       2018-05-10 14:28:00                           [facility]

 [place]

 

             BMI Calculated 2018-05-10 14:28:00                           [facility]

[facility] [place]

 

             Heart Rate   2018-05-10 14:28:00                           [facility]

 [place]

 

             Height       2018-05-10 14:28:00 190.5 cm                  [facility]

 [facility]

 

             Systolic (mm Hg) 2018-05-10 14:28:00                           [facility]

rial [place]

 

             Diastolic (mm Hg) 2018-05-10 14:28:00                           [facility]

[facility]

 

             Weight       2018 19:05:00                           [facility]

 [place]

 

             Heart Rate   2018 19:05:00                           [facility]

 [facility]

 

             Respitory Rate 2018 19:05:00                           [facility]

[facility] [place]

 

             Systolic (mm Hg) 2018 19:05:00                           [facility]

[facility] [place]

 

             Diastolic (mm Hg) 2018 19:05:00                           [facility]

[facility]

 

             Weight       2017 16:00:00                           [facility]

 [facility]

 

             Heart Rate   2017 16:00:00                           [facility]

 [place]

 

             Respitory Rate 2017 16:00:00                           [facility]

[facility] [place]

 

             Systolic (mm Hg) 2017 16:00:00                           [facility]

rial [place]

 

             Diastolic (mm Hg) 2017 16:00:00                           [facility]

[facility] [place]

 

             BMI Calculated 2017 16:23:00                           [facility]

al [place]

 

             Weight       2017 16:23:00                           [facility]

 [place]

 

             Systolic (mm Hg) 2017 16:23:00                           [facility]

rial [place]

 

             Diastolic (mm Hg) 2017 16:23:00                           [facility]

[facility] [place]

 

             Height       2017 16:23:00 190.5 cm                  [facility]

 [place]

 

             Respitory Rate 2017 16:23:00                           [facility]

[facility] [place]

 

             Heart Rate   2017 16:23:00                           [facility]

 [facility]

 

             Weight       2017-11-15 14:13:00                           [facility]

 [place]

 

             BMI Calculated 2017-11-15 14:13:00                           [facility]

[facility] [place]

 

             Height       2017-11-15 14:13:00 190.5 cm                  [facility]

 [place]

 

             Systolic (mm Hg) 2017-11-15 14:13:00                           [facility]

[facility] [place]

 

             Diastolic (mm Hg) 2017-11-15 14:13:00                           [facility]

[facility]

 

             Heart Rate   2017-11-15 14:13:00                           [facility]

 [facility]

 

             Weight       2015 13:30:42                           [facility]

 [place]

 

             Temperature Oral (F) 2015 13:30:42 97 F                      

[facility]

 

             Heart Rate   2015 13:30:42                           [facility]

 [place]

 

             Systolic (mm Hg) 2015 13:30:42                           [facility]

rial [place]

 

             Diastolic (mm Hg) 2015 13:30:42                           [facility]

[facility] [place]

 

             Weight       2014-10-14 13:32:21                           [facility]

 [place]

 

             Temperature Oral (F) 2014-10-14 13:32:21 97 F                      

[facility] [place]

 

             Heart Rate   2014-10-14 13:32:21                           [facility]

 [facility]

 

             Systolic (mm Hg) 2014-10-14 13:32:21                           [facility]

rial [place]

 

             Diastolic (mm Hg) 2014-10-14 13:32:21                           [facility]

[facility] [place]

 

             Weight       2014 20:43:52                           [facility]

 [place]

 

             Temperature Oral (F) 2014 20:43:52 98 F                      

[facility]

 

             Heart Rate   2014 20:43:52                           [facility]

 [place]

 

             Systolic (mm Hg) 2014 20:43:52                           [facility]

rial [place]

 

             Diastolic (mm Hg) 2014 20:43:52                           [facility]

[facility] [place]

 

             Weight       2013 18:57:51                           [facility]

 [facility]

 

             Heart Rate   2013 18:57:51                           [facility]

 [place]

 

             Systolic (mm Hg) 2013 18:57:51                           [facility]

rial [place]

 

             Diastolic (mm Hg) 2013 18:57:51                           [facility]

[facility]

 

             Weight       2013 20:49:52                           [facility]

 [place]

 

             Heart Rate   2013 20:49:52                           [facility]

 [place]

 

             Systolic (mm Hg) 2013 20:49:52                           [facility]

rial [place]

 

             Diastolic (mm Hg) 2013 20:49:52                           [facility]

[facility] [place]

 

             Height       2012 19:49:01                           [facility]

 [place]

 

             Weight       2012 19:49:01                           [facility]

 [place]

 

             Heart Rate   2012 19:49:01                           [facility]

 [facility]

 

             Systolic (mm Hg) 2012 19:49:01                           [facility]

rial [place]

 

             Diastolic (mm Hg) 2012 19:49:01                           [facility]

orial [facility]







Procedures







                Procedure       Date / Time Performed Performing Clinician [facility]

e

 

                Operation                                       [facility]







Encounters







        Start   End     Encounter Admission Attending Care    Care    Encounter 

Source



        Date/Time Date/Time Type    Type    Clinicians Facility Department ID   

   

 

        2021 Outpatient                 [facility]  [facility]  [id number]

 [facility]



        00:00:00 00:00:00                                                 [facility]



                                                                        Outpati



                                                                        ent



                                                                        Clinics

 

        2021 Telephone         Kirby,    [facility]    [id number]

[id number] 



        00:00:00 00:00:00                 Bobbi Carballo [id number]         



                                                [place] [id number]         



                                                [place] [phone]         



                                                [facility]             



                                                [facility]                 

 

        2021 Orders          Doctor TELLEZ    [id number]

[id number] 



        00:00:00 00:00:00 Only            Unassigned, FLOWER   [id number]       

  



                                        [facility] [id number]         



                                                        [phone]         



                                                        009             

 

        2021 Outpatient                 [facility]  [facility]  [id number]

 [facility]



        00[facility]00 [facility]00[facility]00                                                 [facility]



                                                                        [facility]



                                                                        Outpati



                                                                        ent



                                                                        Clinics

 

        2020 Telephone         Kirby    [facility]    [id number]

[id number] 



        00:00:00 00:00:00                 Bobbi Carballo [id number]         



                                                [place] [id number]         



                                                [facility] [phone]         



                                                nal     [id number]             



                                                [facility]                 

 

        2020 Office          Jeannie [facility]    [id number]

[id number] 



        09:49:07 10:19:07 Visit           Bekah Carballo [id number]        

 



                                                [place] [id number]         



                                                [facility] [phone]         



                                                nal     [id number]             



                                                [facility]                 

 

        2020-10-27 2020-10-27 Outpatient                 [facility]  [facility]  [id number]

 [facility]



        00[facility]00[facility]00[facility]00                                                 [facility]



                                                                        [facility]



                                                                        l



                                                                        Outpati



                                                                        ent



                                                                        Clinics

 

        2020-10-21 2020-10-21 Outpatient         NERI Read [facility] 402

[id number] 



        08:15:00 23:59:59                 Pee Arteaga      



                                        [place]                         

 

        2020-10-20 2020-10-20 Outpatient                 [facility]  [facility]  [id number]

 [facility]



        00[facility]00                                                 [facility]



                                                                        l



                                                                        Outpati



                                                                        ent



                                                                        Clinics

 

        2020-10-07 2020-10-07 Out[facility]                 [id number]

 [facility]



        00[facility]00                                                 [facility]



                                                                        l



                                                                        Outpati



                                                                        ent



                                                                        Clinics

 

        2020 Outpatient                 [facility]  [facility]  [id number]

 [facility]



        00:00[facility]00[facility]00                                                 [facility]



                                                                        l



                                                                        Outpati



                                                                        ent



                                                                        Clinics

 

        2018 Outpatient                 [facility]    [id number]

[id number] 



        09:32:00 23:59:59                                         03      

 

        2018 Outpatient         Leonid     [facility]    4021

[id number] 



        09:15:00 23:59:59                 Gumaro Ibanez                         

 

        2018-05-10 2018-05-10 Outpatient         AISHA Jaquez    [facility]    4021

[id number] 



        09:30:00 23:59:59                 Gumaro Ibanez                         

 

        2018-05-10 2018-05-10 Outpatient         AISHA Jaquez    [facility]    4021

[id number] 



        09:30:00 23:59:59                 Gumaro Ibanez                         

 

        2018 Outpatient                 MG    MG    [id number]

[id number] 



        14:55:00 23:59:59                                         02      

 

        2018 Outpatient                 MG    MG    [id number]

455 



        13:57:00 23:59:59                                         01      

 

        2018 Outpatient                 [facility]    [facility]    [id number]

465 



        14:00:00 23:59:59                                         11      

 

        2017 Outpatient                 MG    MG    [id number]

465 



        10:00:00 23:59:59                                         10      

 

        2017 Outpatient                 MG    MG    [id number]

465 



        10:15:00 23:59:59                                         09      

 

        2017-11-15 2017-11-15 Outpatient         Leonid [facility]    4021

[id number] 



        08:30:00 23:59:59                 Gumaro Ibanez                         







Results







           Test Description Test Time  Test Comments Results    Result Comments 

Source

 

           Chemistry  2015            138                   [facility]

nn



                      13:27:00                                    

 

           Chemistry  2015            4.4                   [facility]

nn



                      13:27:00                                    

 

           Chemistry  2015            4.4                   [facility]

nn



                      13:27:00                                    

 

           Chemistry  2015            9.4                   [facility]

nn



                      13:27:00                                    

 

           Chemistry  2015            0.82                  [facility]

nn



                      13:27:00                                    

 

           Chemistry  2015            17                    [facility]

nn



                      13:27:00                                    

 

           Chemistry  2015            86                    [facility]

nn



                      13:27:00                                    

 

           Chemistry  2015            22                    [facility]

nn



                      13:27:00                                    

 

           Chemistry  2015            17                    [facility]

nn



                      13:27:00                                    

 

           Chemistry  2015            228                   [facility]

nn



                      13:27:00                                    

 

           Chemistry  2015            51                    [facility]

nn



                      13:27:00                                    

 

           Chemistry  2015            132                   [facility]

nn



                      13:27:00                                    

 

           Hematology 2015            14.3                  [facility]

nn



                      13:27:00                                    

 

           Hematology 2015            42.9                  [facility]

nn



                      13:27:00                                    

 

           Chemistry  2014-10-13            140                   [facility]

nn



                      13:05:00                                    

 

           Chemistry  2014-10-13            4.4                   [facility]

nn



                      13:05:00                                    

 

           Chemistry  2014-10-13            140                   [facility]

nn



                      13:05:00                                    

 

           Chemistry  2014-10-13            4.4                   [facility]

nn



                      13:05:00                                    

 

           Chemistry  2014-10-13            4.3                   [facility]

nn



                      13:05:00                                    

 

           Chemistry  2014-10-13            9.4                   [facility]

nn



                      13:05:00                                    

 

           Chemistry  2014-10-13            0.73                  [facility]

nn



                      13:05:00                                    

 

           Chemistry  2014-10-13            15                    [facility]

nn



                      13:05:00                                    

 

           Chemistry  2014-10-13            80                    [facility]

nn



                      13:05:00                                    

 

           Chemistry  2014-10-13            27                    [facility]

nn



                      13:05:00                                    

 

           Chemistry  2014-10-13            29                    [facility]

nn



                      13:05:00                                    

 

           Chemistry  2014-10-13            259                   [facility]

nn



                      13:05:00                                    

 

           Chemistry  2014-10-13            49                    [facility]

nn



                      13:05:00                                    

 

           Chemistry  2014-10-13            145                   [facility]

nn



                      13:05:00                                    

 

           Chemistry  2014-10-13            1.16                  [facility]

nn



                      13:05:00                                    

 

           Chemistry  2014-10-13            1.48                  [facility]

nn



                      13:05:00                                    

 

           Chemistry  2013-08-15            1.31                  [facility]

nn



                      14:19:20                                    

 

           Chemistry  2013-08-15            1.31                  [facility]

nn



                      14:19:20                                    

 

           Chemistry  2011            3.02                  [facility]

nn



                      20:29:55                                    

 

           Chemistry  2011            3.02                  [facility]

nn



                      20:29:55                                    

 

           Chemistry  2005            1.2                   [facility]

nn



                      19:04:19                                    

 

           Chemistry  2005            1.2                   [facility]

nn



                      19:04:19

## 2021-01-29 NOTE — XMS REPORT
Summary of Care

                          Created on:2020



Patient:Shekhar Griffin

Sex:Male

:1955

External Reference #:[id number]





Demographics







                          Address                   [address] [place] [address]

 

                          Mobile Phone              [phone]

 

                          Home Phone                [phone]

 

                          Phone                     [phone]

 

                          Email Address             [email or web address]

 

                          Preferred Language        English

 

                          Marital Status            

 

                          Voodoo Affiliation     Unknown

 

                          Race                      White

 

                          Ethnic Group              Not  or 









Author







                          Organization              [facility]

 

                          Address                   [address] [place] [address]









Support







                Name            Relationship    Address         Phone

 

                Gaby Mehta Unavailable     [address] [facility] [phone]

[phone]



                                                [place] [address] 









Care Team Providers







                    Name                Role                Phone

 

                    Clark Daley        Primary Care Provider Unavailable









Reason for Visit







                          Reason                    Comments

 

                          Rx Concern/Question       ticagrelor 90 mg







Encounter Details







             Date         Type         Department   Care Team    Description

 

                2020      Telephone       [facility]     Bobbi Orr M D



                                        Rx Concern/Question



                                                            Cardiology- [address]         (ticagrelor 90 mg)



                                                [address] [place] [address] [address], [address]



                                        



                                                            [address]



                                        [address]



                                        



                                                [place], 

[address]



                                        



                                                            [address]



                                        [phone] (Fax) 







Allergies

No Known Allergiesdocumented as of this encounter (statuses as of 2020)



Medications







          Medication Sig       Dispensed Refills   Start Date End Date  Status

 

          vit A/vit C/vit Take  by mouth           0                            

 Active



          E/zinc/copper daily.                                            



          (PRESERVISION AREDS                                                   



          ORAL)                                                       

 

          nitroglycerin 0.4 Place 1 tablet 1 Bottle  1         2019       

    Active



          mg sublingual under the                                         



          tablet    tongue every                                         



                    5 (five)                                          



                    minutes as                                         



                    needed for                                         



                    Chest pain (if                                         



                    pain not                                          



                    relieved after                                         



                    3 doses (15                                         



                    min) go to an                                         



                    ER).                                              

 

          psyllium husk Take  by            0                             Active



          (METAMUCIL ORAL) mouth.                                            

 

          aspirin 81 mg Take 1 tablet 30 tablet 11        2020           A

ctive



          chewable  by mouth                                          



          tabletIndications: daily.                                            



          History of                                                   



          percutaneous                                                   



          coronary                                                    



          intervention                                                   

 

          amLODIPine 5 mg Take 1 tablet 30 tablet 0         2020          

 Active



          tabletIndications: by mouth every                                     

    



          History of morning.                                          



          percutaneous                                                   



          coronary                                                    



          intervention                                                   

 

          atorvastatin 80 mg Take 1 tablet 30 tablet 3         2020       

    Active



          tabletIndications: by mouth at                                        

 



          History of bedtime.                                          



          percutaneous                                                   



          coronary                                                    



          intervention                                                   

 

          LISINOPRIL 40 mg TAKE 1 TABLET 90 tablet 2         2020         

  Active



          tabletIndications: BY MOUTH EVERY                                     

    



          Essential DAY IN THE                                         



          hypertension MORNING                                           

 

          TRIAMTERENE-HYDROCH TAKE 1 CAPSULE 90 capsule 1         2020    

       Active



          LOROTHIAZIDE BY MOUTH EVERY                                         



          37.5-25 mg per DAY IN THE                                         



          capsule   MORNING                                           

 

          VASCEPA 1 gram TAKE 2    360 capsule 2         10/14/2020           Ac

tive



          capsule   CAPSULES BY                                         



                    MOUTH 2 (TWO)                                         



                    TIMES DAILY.                                         

 

          ticagrelor 90 mg Take 1 tablet 180 tablet 3         2020        

   Active



          tabletIndications: by mouth 2                                         



          History of (two) times                                         



          percutaneous daily.                                            



          coronary                                                    



          intervention                                                   

 

          ticagrelor 90 mg Take 1 tablet 60 tablet 11        2020

2 Discontinued



          tabletIndications: by mouth 2                               0         



          History of (two) times                                         



          percutaneous daily.                                            



          coronary                                                    



          intervention                                                   



documented as of this encounter (statuses as of 2020)



Active Problems







                          Problem                   Noted Date

 

                          Coronary artery disease involving native coronary howard

ry of native heart 

2020



                          without angina pectoris   

 

                          History of percutaneous coronary intervention 20

19



documented as of this encounter (statuses as of 2020)



Immunizations







                    Name                Administration Dates Next Due

 

                    Influenza Virus Vaccine 2019          

 

                    Influenza Virus Vaccine Quad IM 3+ YRS 2020          



documented as of this encounter



Social History







             Tobacco Use  Types        Packs/Day    Years Used   Date

 

             Former Smoker                                        









                Smokeless Tobacco: Former User                                 









                Alcohol Use     Drinks/Week     oz/Week         Comments

 

                Yes                                             social









                    Education           Answer              Date Recorded

 

                    What is the highest level of school you have High school gra

duate 2019



                    completed or the highest degree you have received?          

           









                    Financial Resource Strain Answer              Date Recorded

 

                    How hard is it for you to pay for the very basics like Not h

yury at all     

2019



                    food, housing, medical care, and heating?                   

  









                    Food Insecurity     Answer              Date Recorded

 

                    Within the past 12 months, you worried that your food would 

Never true          

2019



                    run out before you got money to buy more.                   

  

 

                    Within the past 12 months, the food you bought just didn't N

ever true          2019



                    last and you didn't have money to get more.                 

    









                    Transportation Needs Answer              Date Recorded

 

                    In the past 12 months, has lack of transportation kept you f

rom No                  2019



                    medical appointments or from getting medications?           

          

 

                    In the past 12 months, has lack of transportation kept you f

rom No                  2019



                    meetings, work, or getting things needed for daily living?  

                   









                          Sex Assigned at Birth     Date Recorded

 

                          Not on file               









                    COVID-19 Exposure   Response            Date Recorded

 

                    In the last month, have you been in contact with No / Unsure

         2020  9:46 AM

CST



                    someone who was confirmed or suspected to have              

       



                    Coronavirus / COVID-19?                     



documented as of this encounter



Last Filed Vital Signs

Not on filedocumented in this encounter



Miscellaneous Notes

Telephone Encounter - Rupa Amanda, RN - 2020  1:54 PM CSTSent 90 day
script per patient request.Electronically signed by Rupa Amanda RN at 
2020  1:55 PM CSTTelephone Encounter - Hue Gallardo - 2020  3:10 
PM CSTShekhar Griffin is a 65 year old male



Patient is calling and stating that the prescription, ticagrelor, sent over to 
the pharmacy was for 30 days and his insurance is not covering it. Patient 
stated his insurance will only cover 90 days prescription. Patient is requesting
if prescription for 90 days supplies can be sent over. Patient is requesting a 
call back in regards to this concern.



[id number] - [address] [address]

Phone: [phone] Fax: [phone]



Electronically signed by Hue Gallardo at 2020  3:13 PM CSTdocumented in 
this encounter



Plan of Treatment







             Date         Type         Specialty    Care Team    Description

 

                2021      Office Visit    Cardiology      Bobbi Orr M D



                                        



                                                                [address] [place] [address]

15



                                        



                                                                [phone] (Fax) 

 

                2021      Office Visit    Pulmonary Disease Emilie Siddiqi DO



                                        



                                                                [address] [place] [address] 

[id number] [phone] (Fax) 

 

                12/15/2021      Office Visit    Pulmonary Disease Kiley Dennis MD



                                        



                                                                [address] [place] [address]



                                        



                                                                [phone] (Fax) 









                Health Maintenance Due Date        Last Done       Comments

 

                HEPATITIS C (HCV) SCREEN 1955                      

 

                DTaP,Tdap,and Td Vaccines (1 - 1975                      



                Tdap)                                           

 

                COLON CANCER SCREENING ANNUAL 2006                      



                FIT/FOBT                                        

 

                COLON CANCER SCREENING FIT DNA 2006                      



                EVERY 3 YEARS                                   

 

                COLON CANCER SCREENING 2006                      



                SIGMOIDOSCOPY EVERY 5 YEARS                                 

 

                COLONOSCOPY     2006                      

 

                Colorectal Cancer Screening 2006                      

 

                Zoster Recombinant Vaccine 2006                      



                (SHINGRIX) (1 of 2)                                 

 

                LUNG CANCER SCREEN: 2011                      



                Recommended for age 55-80 with                                 



                30 + pack year history                                 

 

                Depression Screening 2021      

 

                INFLUENZA VACCINE Completed       2020,     



                                                2019      

 

                PNEUMOCOCCAL 0-64 YEARS Aged Out                        No longe

r eligible based



                COMBINED SERIES                                 on patient's age

 to



                                                                complete this to

pic



documented as of this encounter



Implants







          Implanted Type      Area       Device Identifier Shelf Exp

iration Model / 

Serial



                                                            Date      / Lot

 

          Plate     PLATE     Arm                                     



documented as of this encounter



Results

Not on filedocumented in this encounter



Visit Diagnoses







                                        Diagnosis

 

                                        History of percutaneous coronary interve

ntion







                                        Personal history of surgery to heart and

 great vessels, presenting hazards to 

health



documented in this encounter



Insurance







          Payer     Benefit Plan Subscriber ID Effective Dates Phone     Address

   Type



                    / Group                                           

 

          [employer] OF   [facility] [id number] 2019-Radha [phone] [address]

[address]   [address]



           [place]                            [address]          [id number]



                                        



                                                            [place], [place] 



                                                            [id number]     



documented as of this encounter

## 2021-01-29 NOTE — XMS REPORT
Summary of Care

                          Created on:2020



Patient:Shekhar Griffin

Sex:Male

:1955

External Reference #:[id number]





Demographics







                          Address                   [address] [place] [address]

 

                          Mobile Phone              [phone]

 

                          Home Phone                [phone]

 

                          Phone                     [phone]

 

                          Email Address             [email or web address]

 

                          Preferred Language        English

 

                          Marital Status            

 

                          Mandaen Affiliation     Unknown

 

                          Race                      White

 

                          Ethnic Group              Not  or 









Author







                          Organization              [facility]

 

                          Address                   [address] [place] [address]









Support







                Name            Relationship    Address         Phone

 

                Gaby Mehta Unavailable     [address] [facility] [phone]

[phone]



                                                [place] [address] 









Care Team Providers







                    Name                Role                Phone

 

                    Clark Daley        Primary Care Provider Unavailable









Reason for Visit







                          Reason                    Comments

 

                          Follow-up                 

 

                          Obstructive Sleep Apnea   







Encounter Details







             Date         Type         Department   Care Team    Description

 

             2020   Office Visit [facility] Bekah Dennisuc

[facility]



                                                            Pulmonary Clinic



                                        MD YIFAN



                                        apnea on CPAP



                                                [address] [facility] [address], [address] [facility]

 [address]



                                        (Primary Dx)



                                                            [address]



                                        [address]



                                        



                                                [place], 

[address]



                                        



                                                            [address]



                                        [phone] (Fax) 







Allergies

No Known Allergiesdocumented as of this encounter (statuses as of 2020)



Medications







          Medication Sig       Dispensed Refills   Start Date End Date  Status

 

          vit A/vit C/vit Take  by mouth           0                            

 Active



          E/zinc/copper daily.                                            



          (PRESERVISION AREDS                                                   



          ORAL)                                                       

 

          nitroglycerin 0.4 mg Place 1 tablet 1 Bottle  1         2019    

       Active



          sublingual tablet under the tongue                                    

     



                    every  5 (five)                                         



                    minutes as                                         



                    needed for Chest                                         



                    pain (if pain                                         



                    not relieved                                         



                    after 3 doses                                         



                    (15 min) go to                                         



                    an ER).                                           

 

          psyllium husk Take  by mouth.           0                             

Active



          (METAMUCIL ORAL)                                                   

 

          aspirin 81 mg chewable Take 1 tablet by 30 tablet 11        2020

           Active



          tabletIndications: mouth daily.                                       

  



          History of                                                   



          percutaneous coronary                                                 

  



          intervention                                                   

 

          amLODIPine 5 mg Take 1 tablet by 30 tablet 0         2020       

    Active



          tabletIndications: mouth every                                        

 



          History of morning.                                          



          percutaneous coronary                                                 

  



          intervention                                                   

 

          atorvastatin 80 mg Take 1 tablet by 30 tablet 3         2020    

       Active



          tabletIndications: mouth at                                          



          History of bedtime.                                          



          percutaneous coronary                                                 

  



          intervention                                                   

 

          LISINOPRIL 40 mg TAKE 1 TABLET BY 90 tablet 2         2020      

     Active



          tabletIndications: MOUTH EVERY DAY                                    

     



          Essential hypertension IN THE MORNING                                 

        

 

          TRIAMTERENE-HYDROCHLOR TAKE 1 CAPSULE 90 capsule 1         2020 

          Active



          OTHIAZIDE 37.5-25 mg BY MOUTH EVERY                                   

      



          per capsule DAY IN THE                                         



                    MORNING                                           

 

          VASCEPA 1 gram capsule TAKE 2 CAPSULES 360 capsule 2         10/14/202

0           Active



                    BY MOUTH 2 (TWO)                                         



                    TIMES DAILY.                                         

 

          ticagrelor 90 mg Take 1 tablet by 60 tablet 11        2020      

     Active



          tabletIndications: mouth 2 (two)                                      

   



          History of times daily.                                         



          percutaneous coronary                                                 

  



          intervention                                                   



documented as of this encounter (statuses as of 2020)



Active Problems







                          Problem                   Noted Date

 

                          Coronary artery disease involving native coronary howard

ry of native heart 

2020



                          without angina pectoris   

 

                          History of percutaneous coronary intervention 20

19



documented as of this encounter (statuses as of 2020)



Social History







             Tobacco Use  Types        Packs/Day    Years Used   Date

 

             Former Smoker                                        









                Smokeless Tobacco: Former User                                 









                Alcohol Use     Drinks/Week     oz/Week         Comments

 

                Yes                                             social









                    Education           Answer              Date Recorded

 

                    What is the highest level of school you have High school gra

duate 2019



                    completed or the highest degree you have received?          

           









                    Financial Resource Strain Answer              Date Recorded

 

                    How hard is it for you to pay for the very basics like Not h

yury at all     

2019



                    food, housing, medical care, and heating?                   

  









                    Food Insecurity     Answer              Date Recorded

 

                    Within the past 12 months, you worried that your food would 

Never true          

2019



                    run out before you got money to buy more.                   

  

 

                    Within the past 12 months, the food you bought just didn't N

ever true          2019



                    last and you didn't have money to get more.                 

    









                    Transportation Needs Answer              Date Recorded

 

                    In the past 12 months, has lack of transportation kept you f

rom No                  2019



                    medical appointments or from getting medications?           

          

 

                    In the past 12 months, has lack of transportation kept you f

rom No                  2019



                    meetings, work, or getting things needed for daily living?  

                   









                          Sex Assigned at Birth     Date Recorded

 

                          Not on file               









                    COVID-19 Exposure   Response            Date Recorded

 

                    In the last month, have you been in contact with No / Unsure

         2020  9:46 AM

CST



                    someone who was confirmed or suspected to have              

       



                    Coronavirus / COVID-19?                     



documented as of this encounter



Last Filed Vital Signs







                Vital Sign      Reading         Time Taken      Comments

 

                Blood Pressure  119/83          2020 10:17 AM CST 

 

                Pulse           81              2020 10:17 AM CST 

 

                Temperature     -               -               

 

                Respiratory Rate 19              2020 10:17 AM CST 

 

                Oxygen Saturation 97%             2020 10:17 AM CST 

 

                Inhaled Oxygen Concentration -               -               

 

                Weight          108.9 kg (240 lb) 2020 10:17 AM CST 

 

                Height          190.5 cm (6' 3") 2020 10:17 AM CST 

 

                Body Mass Index 30              2020 10:17 AM CST 



documented in this encounter



Progress Notes

Bekah Dennis MD - 2020 10:00 AM CSTReason for Clinic Visit: The 
patient is currently under Positive Airway Pressure (PAP) treatment for
Obstructive Sleep Apnea (STEVEN).



Chief Complaint: Obstructive Sleep Apnea



History of Present Illness: The usual bed time is8:30-9:30p.m. and wake up 
time is 5a.m. The patient does take intentional naps during the day. The 
patient does not suffer from irresistible sleepattacks during the day. The 
patient does not experience sudden loss of muscle tone when emotional orexcited.
The patient does not report vivid dream-like images and loss of muscle tone when
falling asleep and upon awakening.



Salvisa Sleepiness Scalescore:5(0-24).



Social History: The patient does not smoke cigarettes. The patient 
occasionally drinks alcoholic beverages. Caffeinated beverages consumption: 1-2 
per day.



Family History:The family history is positive for snoring in blood relatives.



Physical Examination: 1) Vital signs: as noted above. 2) General: the patient is
pleasant, well developed, and in NAD. 3) Skin:there are no rashes, edema, or
abnormal pigmentation. 4)Head:thereare no skull deformities or pathological 
facial asymmetry. 5)Eyes:pupils are equal, round, and reactive to light; 
extraocular movements are conjugate and unrestricted, without strabismus or 
nystagmus. 6)ENT:oropharynx reveals low set soft palate and elongated uvula;
the patient displays a goodsniff through both the right and left nostril. 
7)Neck:there are no distended veins or enlarged lymph nodes. 
8)Back:straight, spine - without pathological curvatures. 9)Bilateral 
lower extremitiesare without edema. 10)Neurological- patient is alert, 
oriented and answers questions appropriately.



Review of Systems:

1)Respiratory:positive for snoring and witnessed apneas; 
2)Cardiovascular:positive for hypertension; 3)Endocrine/Metabolic:positive 
for dyslipidemia; 4)Digestive:negative for abnormalities; 5)Urinary:positive
for nocturia; 6)Skeletal:no skeletal abnormalities detected; 
7)Muscular:negative for muscular abnormalities; 8)Nervous:positive for 
hypersomnia; 9)Integumentary:no visible orreported skin or hair abnormalities;
10)Reproductive:no reproductive abnormalities noted; 11)Immune
/Lymphatic/Allergy:positive for respiratory allergies.



Sleep Study Results and PAP Compliance: The HSTon 19revealed 
respiratory disturbance index of 37.8events per hour of total sleep (normal 
&lt;5/hr) with a minimum oxygen saturation by pulse oximetry of 84%. The patient
had Positive Airway Pressure (PAP) titration on 19. On the therapeutic 
night, PAP was titrated to a pressure of 12 cm H2O which was effective in 
normalizing patient's breathing during sleep. The patient reports increased 
daytime alertness while using the machine on an average of 4 hours 48 minutes 
during sleep at home (27% of the nights for &gt;4 hours). The patient tolerates 
the PAP mask well. No side effects of PAP treatment are reported.



Impression: The patient is benefiting from PAP treatment and needs to continue 
regular PAP use.



Diagnosis:    Obstructive Sleep Apnea Syndrome  clinically well controlled by
CPAP treatment  G47.33



Recommendations and Patient Education:

The condition of Obstructive Sleep Apnea was discussed with the patient and the 
possible consequences of untreated sleep apnea regarding quality of sleep, 
daytime alertness, and cardiovascular complications were underlined. All of 
patients questions were welcomed and thoroughly answered.

The patient was encouraged to continue regular PAP treatment at home at 12 cm 
H2O.

Additional time was spent discussing sleep hygiene including: regular bedtime 
and wake-up times; enough sleep hours; going to bed only when sleepy; using bed 
for the sole purpose of sleeping; avoidanceof: 1) caffeinated and alcoholic 
beverages, 2) strenuous cognitive activity, or 3) heavy meals in the evening. 
The patient reported efforts to implement these sleep hygiene measures at home 
and had some additional questions which were answered in detail.

A follow up visit as needed was also recommended.

The patient was instructed to contact us in case of any further questions, 
concerns, problems, or side effects of PAP treatment.

Electronically signed by Bekah Dennis MD at 2020 10:31 AM CST
documented in this encounter



Plan of Treatment







             Date         Type         Specialty    Care Team    Description

 

                2021      Office Visit    Cardiology      Bobbi Orr M D



                                        



                                                                [address]

[address]



                                        



                                                                [phone] (Fax) 

 

                2021      Office Visit    Pulmonary Disease Emilie Siddiqi, 





                                        



                                                                [address] [place] 

[address] [phone] (Fax) 









                Health Maintenance Due Date        Last Done       Comments

 

                HEPATITIS C (HCV) SCREEN 1955                      

 

                DTaP,Tdap,and Td Vaccines (1 - 1975                      



                Tdap)                                           

 

                COLON CANCER SCREENING ANNUAL 2006                      



                FIT/FOBT                                        

 

                COLON CANCER SCREENING FIT DNA 2006                      



                EVERY 3 YEARS                                   

 

                COLON CANCER SCREENING 2006                      



                SIGMOIDOSCOPY EVERY 5 YEARS                                 

 

                COLONOSCOPY     2006                      

 

                Colorectal Cancer Screening 2006                      

 

                Zoster Recombinant Vaccine 2006                      



                (SHINGRIX) (1 of 2)                                 

 

                LUNG CANCER SCREEN: Recommended 2011                      



                for age 55-80 with 30 + pack year                               

  



                history                                         

 

                INFLUENZA VACCINE (#1) 2020                      

 

                Depression Screening 2021      

 

                PNEUMOCOCCAL 0-64 YEARS COMBINED Aged Out                       

 No longer eligible based on



                SERIES                                          patient's age to

 complete



                                                                this topic



documented as of this encounter



Implants







          Implanted Type      Area       Device Identifier Shelf Exp

iration Model / 

Serial



                                                            Date      / Lot

 

          Plate     PLATE     Arm                                     



documented as of this encounter



Results

Not on filedocumented in this encounter



Visit Diagnoses







                                        Diagnosis

 

                                        Obstructive sleep apnea on CPAP - Primar

y







                                        Obstructive sleep apnea (adult) (pediatr

ic)



documented in this encounter



Insurance







          Payer     Benefit Plan Subscriber ID Effective Dates Phone     Address

   Type



                    / Group                                           

 

          [employer] OF   Joint venture between [facility] [employer] [id number] 2019-Radha [phone] [address]

[address]   [address]



           [place]                            [id number]



                                        



                                                            [place], [place] 



                                                            [address]     









           Guarantor Name Account Type Relation to Date of    Phone      Billing



                                 Patient    Birth                 Address

 

           Shekhar Griffin Personal/Family Self       1955 [phone] 

[address]



                                                       (Home)     [place] [address]



documented as of this encounter

## 2021-01-29 NOTE — XMS REPORT
Summarization of Episode Note

                           Created on:2021



Patient:Shekhar Pop

Sex:Male

:1955

External Reference #:[id number]





Demographics







                          Address                   [address] [place] [address]

 

                          Home Phone                [phone]

 

                          Mobile Phone              [phone]

 

                          Preferred Language        en

 

                          Marital Status            Unknown

 

                          Shinto Affiliation     Unknown

 

                          Race                      Unknown

 

                          Additional Race(s)        Other Race

 

                          Ethnic Group              Not  or 









Author







                          Organization              [facility]

 

                          Address                   [address] [address]



                                                    [place] [address]

 

                          Phone                     [phone]









Support







                Name            Relationship    Address         Phone

 

                Shekhar Pop Unavailable     [address] [facility] [phone]

[phone]



                                                [place] [address] 

 

                RASHI POP Unavailable     [address] [facility] [phone]



                                                [place] [address] 









Care Team Providers







                    Name                Role                Phone

 

                    Lane Banuelos       Unavailable         [phone]









PROBLEMS







        Type    Condition ICD9-CM YUD81-BY Onset   Condition SNOMED Code Notes



                        Code    Code    Dates   Status          

 

        Problem Carpal tunnel         G56.02          Active  [id number] 



                syndrome of                                         



                left wrist                                         

 

        Problem Carpal tunnel         G56.01          Active  [id number] 



                syndrome,                                         



                right                                           







ALLERGIES

No Known Allergies



ENCOUNTERS from 1955 to 2021







             Encounter    Location     Date         Provider     Diagnosis

 

             [facility] [address]  Lane Banuelos Carp

al tunnel



             [facility] [address],                           

syndrome of left



             [place] [address]                           wrist G56.02 ;

 Pain



                                                                 in joint of rig

ht



                                                                 hand M25.541 ;



                                                                 Pain, joint, ha

nd,



                                                                 left M25.542 an

d



                                                                 Carpal tunnel



                                                                 syndrome, right



                                                                 G56.01







IMMUNIZATIONS







                Vaccine         Route           Administration Date Status

 

                LIDOCAINE HCL 10MG/ML Unknown         2019  Administer

ed

 

                Kenalog (Triamcinolone) Unknown         2019  Administ

ered







SOCIAL HISTORY

Tobacco Use:





                    Social History Observation Description         Date

 

                    Details (start date - stop date) Never Smoker        



Sex Assigned At Birth:





                          Social History Observation Description

 

                          Sex Assigned At Birth     Unknown



Alcohol Screen





                    Question            Answer              Notes

 

                    Did you have a drink containing alcohol in the past year? No

                  

 

                    Points              0                   

 

                    Interpretation      Negative            



Tobacco Use/Smoking





                    Question            Answer              Notes

 

                    Are you a           never smoker        

 

                    Additional Findings: Tobacco Non-User Current non-smoker  







REASON FOR REFERRAL

No Information



VITAL SIGNS







                    Height              75 in               

 

                    Weight              250 lbs             

 

                    Temperature         97.1 degrees Fahrenheit 

 

                    BMI                 31.24 kg/m2         

 

                    Blood pressure systolic 114 mm Hg           

 

                    Blood pressure diastolic 74 mm Hg            







MEDICATIONS







           Medication SIG (Take, Route, Notes      Start Date End Date   Status



                      Frequency, Duration)                                  

 

           Latanoprost                                             Active

 

           Tylenol PM Extra 1 tablet at bedtime as                  

      Active



           Strength 500-25 MG needed Orally Once a                              

    



                      day for 30 day(s)                                  

 

           PreserVision AREDS 2 - TAKE 1 CAPSULE BY                             

     Active



                      MOUTH EVERY DAY Oral                                  



                      for 90                                      

 

           Aspirin 81                                             Active

 

           Vascepa                                                Active

 

           Brilinta                                               Active

 

           Align -    as directed Orally                        Acti

ve

 

           Centrum Silver - as directed Orally                      

  Active

 

           Tramadol HCl 50 MG 1 tablet as needed            20 Oct, 2020        

    Active



                      Orally Q6H PRN PAIN                                  

 

           Atorvastatin Calcium                                             Acti

ve

 

           Amlodipine Besylate 10 TAKE 1 TABLET BY MOUTH                        

          Active



           MG         EVERY DAY Oral for 90                                  

 

           Simvastatin 40 MG TAKE 1 TABLET BY MOUTH                             

     Not-Taking



                      EVERYDAY AT BEDTIME                                  



                      Oral for 90                                  

 

           Sucralfate 1 GM TAKE 1 TABLET BY MOUTH                               

   Not-Taking



                      THREE TIMES A DAY Oral                                  



                      for 30                                      

 

           Triamterene-HCTZ                                             Active

 

           Lisinopril 20 MG TAKE 1 TABLET BY MOUTH                              

    Active



                      EVERY DAY Oral for 90                                  

 

           Omeprazole 20 MG TAKE 1 CAPSULE BY                                  N

ot-Taking



                      MOUTH EVERY DAY Oral                                  



                      for 90                                      







PROCEDURES

No Information



RESULTS

No Results



REASON FOR VISIT

F/U LEFT HAND PAIN



MEDICAL (GENERAL) HISTORY







                    Type                Description         Date

 

                    Medical History     HTN                 

 

                    Medical History     high cholesterol    

 

                    Medical History     Taking aspirin      

 

                    Medical History     Heart stent         

 

                    Surgical History    left index finger   

 

                    Surgical History    left wrist-metal    

 

                    Surgical History    right RCR           19







Goals Section

No Information



Health Concerns

No Information



MEDICAL EQUIPMENT

No Information



MENTAL STATUS

No Information



FUNCTIONAL STATUS

No Information



ASSESSMENTS







             Encounter Date Diagnosis    Assessment Notes Treatment Notes Treatm

ent



                                                                 Clinical Notes

 

              Carpal tunnel              -discussed both 



                          syndrome of left              operative and 



                          wrist (ICD-10 -              nonoperative 



                                G56.02)                         treatment  



                                        



                                                    -he has failed 



                                                    conservative 



                                                    treatment including 



                                                    NSAIDs and night 



                                                                splints 



                                        



                                                    -EMG/NCS     



                                                    demonstrates severe 



                                                    carpal tunnel 



                                                                syndrome



                                        



                                                    -will proceed with 



                                                    left carpal tunnel 



                                                    release at the end 



                                                                of the month



                                        



                                                    -f/u 1 week postop 



                                                    then will discuss 



                                                    right carpal tunnel 



                                                    release      

 

              Pain in joint of                           



                          right hand                             



                          (ICD-10 -                              



                          M25.541)                               

 

              Pain, joint,                           



                          hand, left                             



                          (ICD-10 -                              



                          M25.542)                               

 

              Carpal tunnel              -discussed both 



                          syndrome, right              operative and 



                          (ICD-10 - G56.01)              nonoperative 



                                                                treatment  



                                        



                                                    -he has failed 



                                                    conservative 



                                                    treatment including 



                                                    NSAIDs and night 



                                                                splints 



                                        



                                                    -EMG/NCS     



                                                    demonstrates severe 



                                                    carpal tunnel 



                                                                syndrome



                                        



                                                    -will proceed with 



                                                    left carpal tunnel 



                                                    release at the end 



                                                                of the month



                                        



                                                    -f/u 1 week postop 



                                                    then will discuss 



                                                    right carpal tunnel 



                                                    release      







PLAN OF TREATMENT

Treatment Notes





                    Assessment          Notes               Clinical Notes

 

                    Carpal tunnel syndrome of left -discussed both operative and

 



                    wrist               nonoperative treatment-he has failed 



                                        conservative treatment including 



                                        NSAIDs and night splints-EMG/NCS 



                                        demonstrates severe carpal tunnel 



                                        syndrome-will proceed with left 



                                        carpal tunnel release at the end of 



                                        the month-f/u 1 week postop then 



                                        will discuss right carpal tunnel 



                                        release             

 

                    Carpal tunnel syndrome, right -discussed both operative and 



                                        nonoperative treatment-he has failed 



                                        conservative treatment including 



                                        NSAIDs and night splints-EMG/NCS 



                                        demonstrates severe carpal tunnel 



                                        syndrome-will proceed with left 



                                        carpal tunnel release at the end of 



                                        the month-f/u 1 week postop then 



                                        will discuss right carpal tunnel 



                                        release             



Next Appt





                                        Details

 

                                        f/u 1 week postop Reason:







Insurance Providers







          Payer Name Payer     Payer     Insured Name Patient   Coverage  Covera

ge End



                    Address   Phone               Relationship to Start Date Jas

e



                                                  Insured             

 

          [employer] [address]    [phone] Moises Pop                



          and Ehsan  [phone]    [address]        [facility]                                         



                    [address]

## 2021-01-29 NOTE — XMS REPORT
Summary of Care

                          Created on:2020



Patient:Shekhar Griffin

Sex:Male

:1955

External Reference #:[id number]





Demographics







                          Address                   [address] [place] [address]

 

                          Mobile Phone              [phone]

 

                          Home Phone                [phone]

 

                          Phone                     [phone]

 

                          Email Address             [email or web address]

 

                          Preferred Language        English

 

                          Marital Status            

 

                          Catholic Affiliation     Unknown

 

                          Race                      White

 

                          Ethnic Group              Not  or 









Author







                          Organization              [facility]

 

                          Address                   [address] [place] [id number]









Support







                Name            Relationship    Address         Phone

 

                Gaby Mehta Unavailable     [address] [facility] [phone]

[phone]



                                                [place] [address] 









Care Team Providers







                    Name                Role                Phone

 

                    Clark Daley        Primary Care Provider Unavailable









Reason for Visit







                          Reason                    Comments

 

                          Refill Request            







Encounter Details







             Date         Type         Department   Care Team    Description

 

                2020      Refill          [facility] Cardiology- Dixon Orr MD



                                        Refill Request



                                                            [address]



                                        



                                                [address] [facility], [address]



                                        



                                                            [address]



                                        [place] [address]



                                        



                                                            [place] [address]

170



                                        [phone] (Fax) 







Allergies

No Known Allergiesdocumented as of this encounter (statuses as of 2020)



Medications







          Medication Sig       Dispensed Refills   Start Date End Date  Status

 

          vit A/vit C/vit Take  by            0                             Acti

ve



          E/zinc/copper mouth daily.                                         



          (PRESERVISION                                                   



          AREDS ORAL)                                                   

 

          nitroglycerin 0.4 Place 1   1 Bottle  1         2019           A

ctive



          mg sublingual tablet under                                         



          tablet    the tongue                                         



                    every  5                                          



                    (five)                                            



                    minutes as                                         



                    needed for                                         



                    Chest pain                                         



                    (if pain not                                         



                    relieved                                          



                    after 3 doses                                         



                    (15 min) go                                         



                    to an ER).                                         

 

          psyllium husk Take  by            0                             Active



          (METAMUCIL ORAL) mouth.                                            

 

          aspirin 81 mg Take 1 tablet 30 tablet 11        2020           A

ctive



          chewable  by mouth                                          



          tabletIndications: daily.                                            



          History of                                                   



          percutaneous                                                   



          coronary                                                    



          intervention                                                   

 

          amLODIPine 5 mg Take 1 tablet 30 tablet 0         2020          

 Active



          tabletIndications: by mouth                                          



          History of every                                             



          percutaneous morning.                                          



          coronary                                                    



          intervention                                                   

 

          atorvastatin 80 mg Take 1 tablet 30 tablet 3         2020       

    Active



          tabletIndications: by mouth at                                        

 



          History of bedtime.                                          



          percutaneous                                                   



          coronary                                                    



          intervention                                                   

 

          LISINOPRIL 40 mg TAKE 1 TABLET 90 tablet 2         2020         

  Active



          tabletIndications: BY MOUTH                                          



          Essential EVERY DAY IN                                         



          hypertension THE MORNING                                         

 

          TRIAMTERENE-HYDROC TAKE 1    90 capsule 1         2020          

 Active



          HLOROTHIAZIDE CAPSULE BY                                         



          37.5-25 mg per MOUTH EVERY                                         



          capsule   DAY IN THE                                         



                    MORNING                                           

 

          VASCEPA 1 gram TAKE 2    360 capsule 2         10/14/2020           Ac

tive



          capsule   CAPSULES BY                                         



                    MOUTH 2 (TWO)                                         



                    TIMES DAILY.                                         

 

          ticagrelor 90 mg Take 1 tablet 60 tablet 11        2020         

  Active



          tabletIndications: by mouth 2                                         



          History of (two) times                                         



          percutaneous daily.                                            



          coronary                                                    



          intervention                                                   

 

          ticagrelor 90 mg Take 1 tablet 60 tablet 11        2020

  Discontinued



          tabletIndications: by mouth 2                               20        

(Reorder)



          History of (two) times                                         



          percutaneous daily.                                            



          coronary                                                    



          intervention                                                   



documented as of this encounter (statuses as of 2020)



Active Problems







                          Problem                   Noted Date

 

                          Coronary artery disease involving native coronary howard

ry of native heart 

2020



                          without angina pectoris   

 

                          History of percutaneous coronary intervention 20

19



documented as of this encounter (statuses as of 2020)



Social History







             Tobacco Use  Types        Packs/Day    Years Used   Date

 

             Former Smoker                                        









                Smokeless Tobacco: Former User                                 









                Alcohol Use     Drinks/Week     oz/Week         Comments

 

                Yes                                             social









                    Education           Answer              Date Recorded

 

                    What is the highest level of school you have High school gra

dusandra 2019



                    completed or the highest degree you have received?          

           









                    Financial Resource Strain Answer              Date Recorded

 

                    How hard is it for you to pay for the very basics like Not h

[place] at all     

2019



                    food, housing, medical care, and heating?                   

  









                    Food Insecurity     Answer              Date Recorded

 

                    Within the past 12 months, you worried that your food would 

Never true          

2019



                    run out before you got money to buy more.                   

  

 

                    Within the past 12 months, the food you bought just didn't N

ever true          2019



                    last and you didn't have money to get more.                 

    









                    Transportation Needs Answer              Date Recorded

 

                    In the past 12 months, has lack of transportation kept you f

rom No                  2019



                    medical appointments or from getting medications?           

          

 

                    In the past 12 months, has lack of transportation kept you f

rom No                  2019



                    meetings, work, or getting things needed for daily living?  

                   









                          Sex Assigned at Birth     Date Recorded

 

                          Not on file               



documented as of this encounter



Last Filed Vital Signs

Not on filedocumented in this encounter



Plan of Treatment







             Date         Type         Specialty    Care Team    Description

 

                2020      Office Visit    Pulmonary Disease Kiley Dennis MD



                                        



                                                                [address] [facility]

r



                                        



                                                                [address]



                                        



                                                                [place], 

[address] [phone] (Fax) 

 

                2021      Office Visit    Cardiology      Bobbi Orr M D



                                        



                                                                [address] [facility]



                                        



                                                                [address]



                                        



                                                                [place], 

[address] [phone] (Fax) 

 

                2021      Office Visit    Pulmonary Disease Danielle Siddiqiwadeirdre, 





                                        



                                                                [facility] [place] 

[address] [phone] (Fax) 









                Health Maintenance Due Date        Last Done       Comments

 

                HEPATITIS C (HCV) SCREEN 1955                      

 

                DTaP,Tdap,and Td Vaccines (1 - 1975                      



                Tdap)                                           

 

                COLON CANCER SCREENING ANNUAL 2006                      



                FIT/FOBT                                        

 

                COLON CANCER SCREENING FIT DNA 2006                      



                EVERY 3 YEARS                                   

 

                COLON CANCER SCREENING 2006                      



                SIGMOIDOSCOPY EVERY 5 YEARS                                 

 

                COLONOSCOPY     2006                      

 

                Colorectal Cancer Screening 2006                      

 

                Zoster Recombinant Vaccine 2006                      



                (SHINGRIX) (1 of 2)                                 

 

                LUNG CANCER SCREEN: Recommended 2011                      



                for age 55-80 with 30 + pack year                               

  



                history                                         

 

                INFLUENZA VACCINE (#1) 2020                      

 

                Depression Screening 2021      

 

                PNEUMOCOCCAL 0-64 YEARS COMBINED Aged Out                       

 No longer eligible based on



                SERIES                                          patient's age to

 complete



                                                                this topic



documented as of this encounter



Implants







          Implanted Type      Area       Device Identifier Shelf Exp

iration Model / 

Serial



                                                            Date      / Lot

 

          Plate     PLATE     Arm                                     



documented as of this encounter



Results

Not on filedocumented in this encounter



Visit Diagnoses







                                        Diagnosis

 

                                        History of percutaneous coronary interve

ntion







                                        Personal history of surgery to heart and

 great vessels, presenting hazards to 

health



documented in this encounter



Insurance







          Payer     Benefit Plan Subscriber ID Effective Dates Phone     Address

   Type



                    / Group                                           

 

          BCBS OF   [facility] [id number] 2019-Radha [phone] [address]

[address]



           [place]                            [address]          [id number]



                                        



                                                            [place], [place] 



                                                            [address]     



documented as of this encounter

## 2021-01-29 NOTE — XMS REPORT
Summarization of Episode Note

                           Created on:2021



Patient:Shekhar Pop

Sex:Male

:1955

External Reference #:[id number]





Demographics







                          Address                   [address] [place] [address]

 

                          Home Phone                [phone]

 

                          Mobile Phone              [phone]

 

                          Preferred Language        en

 

                          Marital Status            Unknown

 

                          Christianity Affiliation     Unknown

 

                          Race                      Unknown

 

                          Additional Race(s)        Other Race

 

                          Ethnic Group              Not  or 









Author







                          Organization              [facility]

 

                          Address                   [address]



                                                    [place] [address]

 

                          Phone                     [phone]









Support







                Name            Relationship    Address         Phone

 

                Shekhar Pop Unavailable     [address] [facility] [phone]

[phone]



                                                [place] [address] 

 

                RASHI POP Unavailable     [address] [facility] [phone]



                                                [place] [address] 









Care Team Providers







                    Name                Role                Phone

 

                    Lane Banuelos       Unavailable         [phone]









PROBLEMS







        Type    Condition ICD9-CM BUZ87-ZR Onset   Condition SNOMED Code Notes



                        Code    Code    Dates   Status          

 

        Problem Carpal tunnel         G56.02          Active  [id number] 



                syndrome of                                         



                left wrist                                         

 

        Problem Carpal tunnel         G56.01          Active  [id number] 



                syndrome,                                         



                right                                           







ALLERGIES

No Known Allergies



ENCOUNTERS from 1955 to 2021







             Encounter    Location     Date         Provider     Diagnosis

 

             [facility] [address]  Reshma Banuelos 



             [facility] [place], [place]                           



                          [address]                             







IMMUNIZATIONS







                Vaccine         Route           Administration Date Status

 

                LIDOCAINE HCL 10MG/ML Unknown         2019  Administer

ed

 

                Kenalog (Triamcinolone) Unknown         2019  Administ

ered







SOCIAL HISTORY

Tobacco Use:





                    Social History Observation Description         Date

 

                    Details (start date - stop date) Never Smoker        



Sex Assigned At Birth:





                          Social History Observation Description

 

                          Sex Assigned At Birth     Unknown



Alcohol Screen





                    Question            Answer              Notes

 

                    Did you have a drink containing alcohol in the past year? No

                  

 

                    Points              0                   

 

                    Interpretation      Negative            



Tobacco Use/Smoking





                    Question            Answer              Notes

 

                    Are you a           never smoker        

 

                    Additional Findings: Tobacco Non-User Current non-smoker  







REASON FOR REFERRAL

No Information



VITAL SIGNS

No information



MEDICATIONS







           Medication SIG (Take, Route, Notes      Start Date End Date   Status



                      Frequency, Duration)                                  

 

           Latanoprost                                             Active

 

           Tylenol PM Extra 1 tablet at bedtime as                  

      Active



           Strength 500-25 MG needed Orally Once a                              

    



                      day for 30 day(s)                                  

 

           PreserVision AREDS 2 - TAKE 1 CAPSULE BY                             

     Active



                      MOUTH EVERY DAY Oral                                  



                      for 90                                      

 

           Aspirin 81                                             Active

 

           Vascepa                                                Active

 

           Brilinta                                               Active

 

           Align -    as directed Orally                        Acti

ve

 

           Centrum Silver - as directed Orally                      

  Active

 

           Tramadol HCl 50 MG 1 tablet as needed            20 Oct, 2020        

    Active



                      Orally Q6H PRN PAIN                                  

 

           Atorvastatin Calcium                                             Acti

ve

 

           Amlodipine Besylate 10 TAKE 1 TABLET BY MOUTH                        

          Active



           MG         EVERY DAY Oral for 90                                  

 

           Simvastatin 40 MG TAKE 1 TABLET BY MOUTH                             

     Not-Taking



                      EVERYDAY AT BEDTIME                                  



                      Oral for 90                                  

 

           Sucralfate 1 GM TAKE 1 TABLET BY MOUTH                               

   Not-Taking



                      THREE TIMES A DAY Oral                                  



                      for 30                                      

 

           Triamterene-HCTZ                                             Active

 

           Lisinopril 20 MG TAKE 1 TABLET BY MOUTH                              

    Active



                      EVERY DAY Oral for 90                                  

 

           Omeprazole 20 MG TAKE 1 CAPSULE BY                                  N

ot-Taking



                      MOUTH EVERY DAY Oral                                  



                      for 90                                      







PROCEDURES

No Information



RESULTS

No Results



REASON FOR VISIT

Surgery



MEDICAL (GENERAL) HISTORY







                    Type                Description         Date

 

                    Medical History     HTN                 

 

                    Medical History     high cholesterol    

 

                    Medical History     Taking aspirin      

 

                    Medical History     Heart stent         

 

                    Surgical History    left index finger   

 

                    Surgical History    left wrist-metal    

 

                    Surgical History    right RCR           19







Goals Section

No Information



Health Concerns

No Information



MEDICAL EQUIPMENT

No Information



MENTAL STATUS

No Information



FUNCTIONAL STATUS

No Information



ASSESSMENTS

No Information



PLAN OF TREATMENT

No Information



Insurance Providers







          Payer Name Payer     Payer     Insured Name Patient   Coverage  Covera

 End



                    Address   Phone               Relationship to Start Date Jas

e



                                                  Insured             

 

          [employer] [address]    [phone] RodrickdenisejamesMoises self                



          [employer]  [id number]    [address]        [facility]                                         



                    [address]

## 2021-01-29 NOTE — P.BOP
Preoperative diagnosis: left carpal tunnel syndrome


Postoperative diagnosis: same


Primary procedure: left open carpal tunnel release


Assistant: NONE,NONE


Estimated blood loss: 2 cc


Specimen: none


Findings: per anesthesia record


Anesthesia: General


Complications: None


Implants: none


Fluids & blood products: per anesthesia record; TT: 19 mins @ 250 mmHg


Transferred to: Recovery Room


Condition: Good

## 2021-01-29 NOTE — XMS REPORT
Summary of Care

                           Created on:2021



Patient:Shekhar Griffin

Sex:Male

:1955

External Reference #:[id number]





Demographics







                          Address                   [address] [place] [address]

 

                          Mobile Phone              [phone]

 

                          Home Phone                [phone]

 

                          Phone                     [phone]

 

                          Email Address             [email or web address]

 

                          Preferred Language        English

 

                          Marital Status            

 

                          [employer]ar[employer] Affiliation     Unknown

 

                          Race                      White

 

                          Ethnic Group              Not  or 









Author







                          Organization              [facility]

 

                          Address                   [address] [place] [address]









Support







                Name            Relationship    Address         Phone

 

                Gaby Mehta Unavailable     [address] [phone]

[phone]



                                                [place] [address] 









Care Team Providers







                    Name                Role                Phone

 

                    Clark Daley        Primary Care Provider Unavailable









Encounter Details







             Date         Type         Department   Care Team    Description

 

                    2021          Orders Only         [facility]



                          Doctor Unassigned, No     



                                                            [address] [facility]



                                        Name



                                        



                                                [place] [address] [address] [place] [address] 







Allergies

No Known Allergiesdocumented as of this encounter (statuses as of 2021)



Medications







          Medication Sig       Dispensed Refills   Start Date End Date  Status

 

          vit A/vit C/vit Take  by mouth           0                            

 Active



          E/zinc/copper daily.                                            



          (PRESERVISION AREDS                                                   



          ORAL)                                                       

 

          nitroglycerin 0.4 mg Place 1 tablet 1 Bottle  1         2019    

       Active



          sublingual tablet under the tongue                                    

     



                    every  5 (five)                                         



                    minutes as                                         



                    needed for Chest                                         



                    pain (if pain                                         



                    not relieved                                         



                    after 3 doses                                         



                    (15 min) go to                                         



                    an ER).                                           

 

          psyllium husk Take  by mouth.           0                             

Active



          (METAMUCIL ORAL)                                                   

 

          aspirin 81 mg chewable Take 1 tablet by 30 tablet 11        2020

           Active



          tabletIndications: mouth daily.                                       

  



          History of                                                   



          percutaneous coronary                                                 

  



          intervention                                                   

 

          amLODIPine 5 mg Take 1 tablet by 30 tablet 0         2020       

    Active



          tabletIndications: mouth every                                        

 



          History of morning.                                          



          percutaneous coronary                                                 

  



          intervention                                                   

 

          atorvastatin 80 mg Take 1 tablet by 30 tablet 3         2020    

       Active



          tabletIndications: mouth at                                          



          History of bedtime.                                          



          percutaneous coronary                                                 

  



          intervention                                                   

 

          LISINOPRIL 40 mg TAKE 1 TABLET BY 90 tablet 2         2020      

     Active



          tabletIndications: MOUTH EVERY DAY                                    

     



          Essential hypertension IN THE MORNING                                 

        

 

          TRIAMTERENE-HYDROCHLOR TAKE 1 CAPSULE 90 capsule 1         2020 

          Active



          OTHIAZIDE 37.5-25 mg BY MOUTH EVERY                                   

      



          per capsule DAY IN THE                                         



                    MORNING                                           

 

          VASCEPA 1 gram capsule TAKE 2 CAPSULES 360 capsule 2         10/14/202

0           Active



                    BY MOUTH 2 (TWO)                                         



                    TIMES DAILY.                                         

 

          ticagrelor 90 mg Take 1 tablet by 180 tablet 3         2020     

      Active



          tabletIndications: mouth 2 (two)                                      

   



          History of times daily.                                         



          percutaneous coronary                                                 

  



          intervention                                                   



documented as of this encounter (statuses as of 2021)



Active Problems







                          Problem                   Noted Date

 

                          Coronary artery disease involving native coronary howard

ry of native heart 

2020



                          without angina pectoris   

 

                          History of percutaneous coronary intervention 20

19



documented as of this encounter (statuses as of 2021)



Immunizations







                    Name                Administration Dates Next Due

 

                    Influenza Virus Vaccine 2019          

 

                    Influenza Virus Vaccine Quad IM 3+ YRS 2020          



documented as of this encounter



Social History







             Tobacco Use  Types        Packs/Day    Years Used   Date

 

             Former Smoker                                        









                Smokeless Tobacco: Former User                                 









                Alcohol Use     Drinks/Week     oz/Week         Comments

 

                Yes                                             social









                    Education           Answer              Date Recorded

 

                    What is the highest level of school you have High school gra

mikey 2019



                    completed or the highest degree you have received?          

           









                    Financial Resource Strain Answer              Date Recorded

 

                    How hard is it for you to pay for the very basics like Not h

yury at all     

2019



                    food, housing, medical care, and heating?                   

  









                    Food Insecurity     Answer              Date Recorded

 

                    Within the past 12 months, you worried that your food would 

Never true          

2019



                    run out before you got money to buy more.                   

  

 

                    Within the past 12 months, the food you bought just didn't N

ever true          2019



                    last and you didn't have money to get more.                 

    









                    Transportation Needs Answer              Date Recorded

 

                    In the past 12 months, has lack of transportation kept you f

rom No                  2019



                    medical appointments or from getting medications?           

          

 

                    In the past 12 months, has lack of transportation kept you f

rom No                  2019



                    meetings, work, or getting things needed for daily living?  

                   









                          Sex Assigned at Birth     Date Recorded

 

                          Not on file               









                    COVID-19 Exposure   Response            Date Recorded

 

                    In the last month, have you been in contact with No / Unsure

         2020  9:46 AM

CST



                    someone who was confirmed or suspected to have              

       



                    Coronavirus / COVID-19?                     



documented as of this encounter



Last Filed Vital Signs

Not on filedocumented in this encounter



Plan of Treatment







             Date         Type         Specialty    Care Team    Description

 

                2021      Office Visit    Cardiology      Bobbi Orr M D



                                        



                                                                [address] [facility]



                                        



                                                                [address]



                                        



                                                                [place], 

[address] [phone] (Fax) 

 

                2021      Office Visit    Pulmonary Disease Emilie Siddiqi DO



                                        



                                                                [address] [place] 

[id number] [phone] (Fax) 

 

                12/15/2021      Office Visit    Pulmonary Disease Kiley Dennis MD



                                        



                                                                [address] [facility] [address]

r



                                        



                                                                [address]



                                        



                                                                [place],  [address] [phone] (Fax) 









                Health Maintenance Due Date        Last Done       Comments

 

                HEPATITIS C (HCV) SCREEN 1955                      

 

                DTaP,Tdap,and Td Vaccines (1 - 1975                      



                Tdap)                                           

 

                COLON CANCER SCREENING ANNUAL 2006                      



                FIT/FOBT                                        

 

                COLON CANCER SCREENING FIT DNA 2006                      



                EVERY 3 YEARS                                   

 

                COLON CANCER SCREENING 2006                      



                SIGMOIDOSCOPY EVERY 5 YEARS                                 

 

                COLONOSCOPY     2006                      

 

                Colorectal Cancer Screening 2006                      

 

                Zoster Recombinant Vaccine 2006                      



                (SHINGRIX) (1 of 2)                                 

 

                LUNG CANCER SCREEN: 2011                      



                Recommended for age 55-80 with                                 



                30 + pack year history                                 

 

                Depression Screening 2021      

 

                INFLUENZA VACCINE Completed       2020,     



                                                2019      

 

                PNEUMOCOCCAL 0-64 YEARS Aged Out                        No longe

r eligible based



                COMBINED SERIES                                 on patient's age

 to



                                                                complete this to

pic



documented as of this encounter



Implants







          Implanted Type      Area       Device Identifier Shelf Exp

iration Model / 

Serial



                                                            Date      / Lot

 

          Plate     PLATE     Arm                                     



documented as of this encounter



Procedures







             Procedure Name Priority     Date/Time    Associated Diagnosis Comme

nts

 

             MEDICAL      Routine      2021 12:01 AM CST              



             RELEASE/CLEARANCE FORMS                                        



documented in this encounter



Results

Not on filedocumented in this encounter



Insurance







          Payer     Benefit Plan Subscriber ID Effective Dates Phone     Address

   Type



                    / Group                                           

 

          [employer] [place] [id number] [phone] [id number]

[address]



           [place]                            [id number]



                                        



                                                            [place], [place] 



                                                            [address]     



documented as of this encounter

## 2021-01-29 NOTE — XMS REPORT
Summary of Care

                          Created on:2020



Patient:Shekhar Griffin

Sex:Male

:1955

External Reference #:[id number]





Demographics







                          Address                   [address] [place] [address]

 

                          Mobile Phone              [phone]

 

                          Home Phone                [phone]

 

                          Phone                     [phone]

 

                          Email Address             [email or web address]

 

                          Preferred Language        English

 

                          Marital Status            

 

                          Lutheran Affiliation     Unknown

 

                          Race                      White

 

                          Ethnic Group              Not  or 









Author







                          Organization              [facility]

 

                          Address                   [address] [place] [id number]









Support







                Name            Relationship    Address         Phone

 

                Gaby Mehta Unavailable     [address] [facility] [phone]

[phone]



                                                [place] [address] 









Care Team Providers







                    Name                Role                Phone

 

                    Clark Daley        Primary Care Provider Unavailable









Reason for Visit







                          Reason                    Comments

 

                          Follow-up                 

 

                          Obstructive Sleep Apnea   







Encounter Details







             Date         Type         Department   Care Team    Description

 

             2020   Office Visit [facility] Bekah Dennisuc

t[facility] sleep



                                                            Pulmonary Clinic



                                        MD YIFAN



                                        apnea on CPAP



                                                [address] [facility] [address], [address] [facility]

 [address]



                                        (Primary Dx)



                                                            [address]



                                        [address]



                                        



                                                [place], 

[address]



                                        



                                                            [address]



                                        [phone] (Fax) 







Allergies

No Known Allergiesdocumented as of this encounter (statuses as of 2020)



Medications







          Medication Sig       Dispensed Refills   Start Date End Date  Status

 

          vit A/vit C/vit Take  by mouth           0                            

 Active



          E/zinc/copper daily.                                            



          (PRESERVISION AREDS                                                   



          ORAL)                                                       

 

          nitroglycerin 0.4 mg Place 1 tablet 1 Bottle  1         2019    

       Active



          sublingual tablet under the tongue                                    

     



                    every  5 (five)                                         



                    minutes as                                         



                    needed for Chest                                         



                    pain (if pain                                         



                    not relieved                                         



                    after 3 doses                                         



                    (15 min) go to                                         



                    an ER).                                           

 

          psyllium husk Take  by mouth.           0                             

Active



          (METAMUCIL ORAL)                                                   

 

          aspirin 81 mg chewable Take 1 tablet by 30 tablet 11        2020

           Active



          tabletIndications: mouth daily.                                       

  



          History of                                                   



          percutaneous coronary                                                 

  



          intervention                                                   

 

          amLODIPine 5 mg Take 1 tablet by 30 tablet 0         2020       

    Active



          tabletIndications: mouth every                                        

 



          History of morning.                                          



          percutaneous coronary                                                 

  



          intervention                                                   

 

          atorvastatin 80 mg Take 1 tablet by 30 tablet 3         2020    

       Active



          tabletIndications: mouth at                                          



          History of bedtime.                                          



          percutaneous coronary                                                 

  



          intervention                                                   

 

          LISINOPRIL 40 mg TAKE 1 TABLET BY 90 tablet 2         2020      

     Active



          tabletIndications: MOUTH EVERY DAY                                    

     



          Essential hypertension IN THE MORNING                                 

        

 

          TRIAMTERENE-HYDROCHLOR TAKE 1 CAPSULE 90 capsule 1         2020 

          Active



          OTHIAZIDE 37.5-25 mg BY MOUTH EVERY                                   

      



          per capsule DAY IN THE                                         



                    MORNING                                           

 

          VASCEPA 1 gram capsule TAKE 2 CAPSULES 360 capsule 2         10/14/202

0           Active



                    BY MOUTH 2 (TWO)                                         



                    TIMES DAILY.                                         

 

          ticagrelor 90 mg Take 1 tablet by 60 tablet 11        2020      

     Active



          tabletIndications: mouth 2 (two)                                      

   



          History of times daily.                                         



          percutaneous coronary                                                 

  



          intervention                                                   



documented as of this encounter (statuses as of 2020)



Active Problems







                          Problem                   Noted Date

 

                          Coronary artery disease involving native coronary howard

ry of native heart 

2020



                          without angina pectoris   

 

                          History of percutaneous coronary intervention 20

19



documented as of this encounter (statuses as of 2020)



Social History







             Tobacco Use  Types        Packs/Day    Years Used   Date

 

             Former Smoker                                        









                Smokeless Tobacco: Former User                                 









                Alcohol Use     Drinks/Week     oz/Week         Comments

 

                Yes                                             social









                    Education           Answer              Date Recorded

 

                    What is the highest level of school you have High school gra

duate 2019



                    completed or the highest degree you have received?          

           









                    Financial Resource Strain Answer              Date Recorded

 

                    How hard is it for you to pay for the very basics like Not h

yury at all     

2019



                    food, housing, medical care, and heating?                   

  









                    Food Insecurity     Answer              Date Recorded

 

                    Within the past 12 months, you worried that your food would 

Never true          

2019



                    run out before you got money to buy more.                   

  

 

                    Within the past 12 months, the food you bought just didn't N

ever true          2019



                    last and you didn't have money to get more.                 

    









                    Transportation Needs Answer              Date Recorded

 

                    In the past 12 months, has lack of transportation kept you f

rom No                  2019



                    medical appointments or from getting medications?           

          

 

                    In the past 12 months, has lack of transportation kept you f

rom No                  2019



                    meetings, work, or getting things needed for daily living?  

                   









                          Sex Assigned at Birth     Date Recorded

 

                          Not on file               









                    COVID-19 Exposure   Response            Date Recorded

 

                    In the last month, have you been in contact with No / Unsure

         2020  9:46 AM

CST



                    someone who was confirmed or suspected to have              

       



                    Coronavirus / COVID-19?                     



documented as of this encounter



Last Filed Vital Signs







                Vital Sign      Reading         Time Taken      Comments

 

                Blood Pressure  119/83          2020 10:17 AM CST 

 

                Pulse           81              2020 10:17 AM CST 

 

                Temperature     -               -               

 

                Respiratory Rate 19              2020 10:17 AM CST 

 

                Oxygen Saturation 97%             2020 10:17 AM CST 

 

                Inhaled Oxygen Concentration -               -               

 

                Weight          108.9 kg (240 lb) 2020 10:17 AM CST 

 

                Height          190.5 cm (6' 3") 2020 10:17 AM CST 

 

                Body Mass Index 30              2020 10:17 AM CST 



documented in this encounter



Progress Notes

Bekah Dennis MD - 2020 10:00 AM CSTReason for Clinic Visit: The 
patient is currently under Positive Airway Pressure (PAP) treatment for
Obstructive Sleep Apnea (STEVEN).



Chief Complaint: Obstructive Sleep Apnea



History of Present Illness: The usual bed time is8:30-9:30p.m. and wake up 
time is 5a.m. The patient does take intentional naps during the day. The 
patient does not suffer from irresistible sleepattacks during the day. The 
patient does not experience sudden loss of muscle tone when emotional orexcited.
The patient does not report vivid dream-like images and loss of muscle tone when
falling asleep and upon awakening.



Cut Off Sleepiness Scalescore:5(0-24).



Social History: The patient does not smoke cigarettes. The patient 
occasionally drinks alcoholic beverages. Caffeinated beverages consumption: 1-2 
per day.



Family History:The family history is positive for snoring in blood relatives.



Physical Examination: 1) Vital signs: as noted above. 2) General: the patient is
pleasant, well developed, and in NAD. 3) Skin:there are no rashes, edema, or
abnormal pigmentation. 4)Head:thereare no skull deformities or pathological 
facial asymmetry. 5)Eyes:pupils are equal, round, and reactive to light; 
extraocular movements are conjugate and unrestricted, without strabismus or 
nystagmus. 6)ENT:oropharynx reveals low set soft palate and elongated uvula;
the patient displays a goodsniff through both the right and left nostril. 
7)Neck:there are no distended veins or enlarged lymph nodes. 
8)Back:straight, spine - without pathological curvatures. 9)Bilateral 
lower extremitiesare without edema. 10)Neurological- patient is alert, 
oriented and answers questions appropriately.



Review of Systems:

1)Respiratory:positive for snoring and witnessed apneas; 
2)Cardiovascular:positive for hypertension; 3)Endocrine/Metabolic:positive 
for dyslipidemia; 4)Digestive:negative for abnormalities; 5)Urinary:positive
for nocturia; 6)Skeletal:no skeletal abnormalities detected; 
7)Muscular:negative for muscular abnormalities; 8)Nervous:positive for 
hypersomnia; 9)Integumentary:no visible orreported skin or hair abnormalities;
10)Reproductive:no reproductive abnormalities noted; 11)Immune
/Lymphatic/Allergy:positive for respiratory allergies.



Sleep Study Results and PAP Compliance: The HSTon 19revealed 
respiratory disturbance index of 37.8events per hour of total sleep (normal 
&lt;5/hr) with a minimum oxygen saturation by pulse oximetry of 84%. The patient
had Positive Airway Pressure (PAP) titration on 19. On the therapeutic 
night, PAP was titrated to a pressure of 12 cm H2O which was effective in 
normalizing patient's breathing during sleep. The patient reports increased 
daytime alertness while using the machine on an average of 4 hours 48 minutes 
during sleep at home (27% of the nights for &gt;4 hours). The patient tolerates 
the PAP mask well. No side effects of PAP treatment are reported.



Impression: The patient is benefiting from PAP treatment and needs to continue 
regular PAP use.



Diagnosis:    Obstructive Sleep Apnea Syndrome  clinically well controlled by
CPAP treatment  G47.33



Recommendations and Patient Education:

The condition of Obstructive Sleep Apnea was discussed with the patient and the 
possible consequences of untreated sleep apnea regarding quality of sleep, 
daytime alertness, and cardiovascular complications were underlined. All of 
patients questions were welcomed and thoroughly answered.

The patient was encouraged to continue regular PAP treatment at home at 12 cm 
H2O.

Additional time was spent discussing sleep hygiene including: regular bedtime 
and wake-up times; enough sleep hours; going to bed only when sleepy; using bed 
for the sole purpose of sleeping; avoidanceof: 1) caffeinated and alcoholic 
beverages, 2) strenuous cognitive activity, or 3) heavy meals in the evening. 
The patient reported efforts to implement these sleep hygiene measures at home 
and had some additional questions which were answered in detail.

A follow up visit as needed was also recommended.

The patient was instructed to contact us in case of any further questions, 
concerns, problems, or side effects of PAP treatment.

Electronically signed by Bekah Dennis MD at 2020 10:31 AM CST
documented in this encounter



Plan of Treatment







             Date         Type         Specialty    Care Team    Description

 

                2021      Office Visit    Cardiology      Bobbi Orr M D



                                        



                                                                [address]

[address]



                                        



                                                                [phone] (Fax) 

 

                2021      Office Visit    Pulmonary Disease Emilie Siddiqi, 





                                        



                                                                [address] [place] 

[address] [phone] (Fax) 









                Health Maintenance Due Date        Last Done       Comments

 

                HEPATITIS C (HCV) SCREEN 1955                      

 

                DTaP,Tdap,and Td Vaccines (1 - 1975                      



                Tdap)                                           

 

                COLON CANCER SCREENING ANNUAL 2006                      



                FIT/FOBT                                        

 

                COLON CANCER SCREENING FIT DNA 2006                      



                EVERY 3 YEARS                                   

 

                COLON CANCER SCREENING 2006                      



                SIGMOIDOSCOPY EVERY 5 YEARS                                 

 

                COLONOSCOPY     2006                      

 

                Colorectal Cancer Screening 2006                      

 

                Zoster Recombinant Vaccine 2006                      



                (SHINGRIX) (1 of 2)                                 

 

                LUNG CANCER SCREEN: Recommended 2011                      



                for age 55-80 with 30 + pack year                               

  



                history                                         

 

                INFLUENZA VACCINE (#1) 2020                      

 

                Depression Screening 2021      

 

                PNEUMOCOCCAL 0-64 YEARS COMBINED Aged Out                       

 No longer eligible based on



                SERIES                                          patient's age to

 complete



                                                                this topic



documented as of this encounter



Implants







          Implanted Type      Area       Device Identifier Shelf Exp

iration Model / 

Serial



                                                            Date      / Lot

 

          Plate     PLATE     Arm                                     



documented as of this encounter



Results

Not on filedocumented in this encounter



Visit Diagnoses







                                        Diagnosis

 

                                        Obstructive sleep apnea on CPAP - Primar

y







                                        Obstructive sleep apnea (adult) (pediatr

ic)



documented in this encounter



Insurance







          Payer     Benefit Plan Subscriber ID Effective Dates Phone     Address

   Type



                    / Group                                           

 

          [employer] OF   [facility] [id number] 2019-Radha [phone] [address]

[address]



           [place]                            [id number]



                                        



                                                            [place], [place] 



                                                            [address]     









           Guarantor Name Account Type Relation to Date of    Phone      Billing



                                 Patient    Birth                 Address

 

           Shekhar Griffin Personal/Family Self       1955 [phone] 

[address]



                                                       (Home)     [place] [address]



documented as of this encounter

## 2021-01-29 NOTE — XMS REPORT
Summary of Care

                          Created on:2020



Patient:Shekhar Griffin

Sex:Male

:1955

External Reference #:[id number]





Demographics







                          Address                   [address] [place] [address]

 

                          Mobile Phone              [phone]

 

                          Home Phone                [phone]

 

                          Phone                     [phone]

 

                          Email Address             [email or web address]

 

                          Preferred Language        English

 

                          Marital Status            

 

                          Alevism Affiliation     Unknown

 

                          Race                      White

 

                          Ethnic Group              Not  or 









Author







                          Organization              [facility]

 

                          Address                   [address] [place] [id number]









Support







                Name            Relationship    Address         Phone

 

                Gaby Mehta Unavailable     [address] [facility] [phone]

[phone]



                                                [place] [address] 









Care Team Providers







                    Name                Role                Phone

 

                    Clark Daley        Primary Care Provider Unavailable









Reason for Visit







                          Reason                    Comments

 

                          Follow-up                 

 

                          Obstructive Sleep Apnea   







Encounter Details







             Date         Type         Department   Care Team    Description

 

             2020   Office Visit [facility] Bekah Dennisuc

t[facility] sleep



                                                            Pulmonary Clinic



                                        MD YIFAN



                                        apnea on CPAP



                                                [address] [facility] [address] [facility] [address], [address]



                                        (Primary Dx)



                                                            [address]



                                        [address]



                                        



                                                [place], 

[address]



                                        



                                                            [address]



                                        [phone] (Fax) 







Allergies

No Known Allergiesdocumented as of this encounter (statuses as of 2020)



Medications







          Medication Sig       Dispensed Refills   Start Date End Date  Status

 

          vit A/vit C/vit Take  by mouth           0                            

 Active



          E/zinc/copper daily.                                            



          (PRESERVISION AREDS                                                   



          ORAL)                                                       

 

          nitroglycerin 0.4 mg Place 1 tablet 1 Bottle  1         2019    

       Active



          sublingual tablet under the tongue                                    

     



                    every  5 (five)                                         



                    minutes as                                         



                    needed for Chest                                         



                    pain (if pain                                         



                    not relieved                                         



                    after 3 doses                                         



                    (15 min) go to                                         



                    an ER).                                           

 

          psyllium husk Take  by mouth.           0                             

Active



          (METAMUCIL ORAL)                                                   

 

          aspirin 81 mg chewable Take 1 tablet by 30 tablet 11        2020

           Active



          tabletIndications: mouth daily.                                       

  



          History of                                                   



          percutaneous coronary                                                 

  



          intervention                                                   

 

          amLODIPine 5 mg Take 1 tablet by 30 tablet 0         2020       

    Active



          tabletIndications: mouth every                                        

 



          History of morning.                                          



          percutaneous coronary                                                 

  



          intervention                                                   

 

          atorvastatin 80 mg Take 1 tablet by 30 tablet 3         2020    

       Active



          tabletIndications: mouth at                                          



          History of bedtime.                                          



          percutaneous coronary                                                 

  



          intervention                                                   

 

          LISINOPRIL 40 mg TAKE 1 TABLET BY 90 tablet 2         2020      

     Active



          tabletIndications: MOUTH EVERY DAY                                    

     



          Essential hypertension IN THE MORNING                                 

        

 

          TRIAMTERENE-HYDROCHLOR TAKE 1 CAPSULE 90 capsule 1         2020 

          Active



          OTHIAZIDE 37.5-25 mg BY MOUTH EVERY                                   

      



          per capsule DAY IN THE                                         



                    MORNING                                           

 

          VASCEPA 1 gram capsule TAKE 2 CAPSULES 360 capsule 2         10/14/202

0           Active



                    BY MOUTH 2 (TWO)                                         



                    TIMES DAILY.                                         

 

          ticagrelor 90 mg Take 1 tablet by 60 tablet 11        2020      

     Active



          tabletIndications: mouth 2 (two)                                      

   



          History of times daily.                                         



          percutaneous coronary                                                 

  



          intervention                                                   



documented as of this encounter (statuses as of 2020)



Active Problems







                          Problem                   Noted Date

 

                          Coronary artery disease involving native coronary howard

ry of native heart 

2020



                          without angina pectoris   

 

                          History of percutaneous coronary intervention 20

19



documented as of this encounter (statuses as of 2020)



Social History







             Tobacco Use  Types        Packs/Day    Years Used   Date

 

             Former Smoker                                        









                Smokeless Tobacco: Former User                                 









                Alcohol Use     Drinks/Week     oz/Week         Comments

 

                Yes                                             social









                    Education           Answer              Date Recorded

 

                    What is the highest level of school you have High school gra

duate 2019



                    completed or the highest degree you have received?          

           









                    Financial Resource Strain Answer              Date Recorded

 

                    How hard is it for you to pay for the very basics like Not h

yury at all     

2019



                    food, housing, medical care, and heating?                   

  









                    Food Insecurity     Answer              Date Recorded

 

                    Within the past 12 months, you worried that your food would 

Never true          

2019



                    run out before you got money to buy more.                   

  

 

                    Within the past 12 months, the food you bought just didn't N

ever true          2019



                    last and you didn't have money to get more.                 

    









                    Transportation Needs Answer              Date Recorded

 

                    In the past 12 months, has lack of transportation kept you f

rom No                  2019



                    medical appointments or from getting medications?           

          

 

                    In the past 12 months, has lack of transportation kept you f

rom No                  2019



                    meetings, work, or getting things needed for daily living?  

                   









                          Sex Assigned at Birth     Date Recorded

 

                          Not on file               









                    COVID-19 Exposure   Response            Date Recorded

 

                    In the last month, have you been in contact with No / Unsure

         2020  9:46 AM

CST



                    someone who was confirmed or suspected to have              

       



                    Coronavirus / COVID-19?                     



documented as of this encounter



Last Filed Vital Signs







                Vital Sign      Reading         Time Taken      Comments

 

                Blood Pressure  119/83          2020 10:17 AM CST 

 

                Pulse           81              2020 10:17 AM CST 

 

                Temperature     -               -               

 

                Respiratory Rate 19              2020 10:17 AM CST 

 

                Oxygen Saturation 97%             2020 10:17 AM CST 

 

                Inhaled Oxygen Concentration -               -               

 

                Weight          108.9 kg (240 lb) 2020 10:17 AM CST 

 

                Height          190.5 cm (6' 3") 2020 10:17 AM CST 

 

                Body Mass Index 30              2020 10:17 AM CST 



documented in this encounter



Progress Notes

Bekah Dennis MD - 2020 10:00 AM CSTReason for Clinic Visit: The 
patient is currently under Positive Airway Pressure (PAP) treatment for
Obstructive Sleep Apnea (STEVEN).



Chief Complaint: Obstructive Sleep Apnea



History of Present Illness: The usual bed time is8-9p.m. and wake up time is
5:30-6a.m. The patient does take occasional naps during the day. The patient 
does not suffer from irresistible sleep attacks during the day. The patient does
not experience sudden loss of muscle tone when emotional or excited. The patient
does not report vivid dream-like images and loss of muscle tone when falling 
asleep and upon awakening.



Sch[place] Sleepiness Scalescore:5(0-24).



Social History: The patient does not smoke cigarettes. The patient 
occasionally drinks alcoholic beverages. Caffeinated beverages consumption: 1-2 
per day.



Family History:The family history is positive for snoring in blood relatives.



Physical Examination: 1) Vital signs: as noted above. 2) General: the patient is
pleasant, well developed, and in NAD. 3) Skin:there are no rashes, edema, or
abnormal pigmentation. 4)Head:thereare no skull deformities or pathological 
facial asymmetry. 5)Eyes:pupils are equal, round, and reactive to light; 
extraocular movements are conjugate and unrestricted, without strabismus or 
nystagmus. 6)ENT:oropharynx reveals low set soft palate and elongated uvula;
the patient displays a goodsniff through both the right and left nostril. 
7)Neck:there are no distended veins or enlarged lymph nodes. 
8)Back:straight, spine - without pathological curvatures. 9)Bilateral 
lower extremitiesare without edema. 10)Neurological- patient is alert, 
oriented and answers questions appropriately.



Review of Systems:

1)Respiratory:positive for snoring and witnessed apneas; 
2)Cardiovascular:positive for hypertension; 3)Endocrine/Metabolic:positive 
for dyslipidemia; 4)Digestive:negative for abnormalities; 5)Urinary:positive
for nocturia; 6)Skeletal:no skeletal abnormalities detected; 
7)Muscular:negative for muscular abnormalities; 8)Nervous:positive for 
hypersomnia; 9)Integumentary:no visible orreported skin or hair abnormalities;
10)Reproductive:no reproductive abnormalities noted; 11)Immune
/Lymphatic/Allergy:positive for respiratory allergies.



Sleep Study Results and PAP Compliance: The HSTon 19revealed 
respiratory disturbance index of 37.8events per hour of total sleep (normal 
&lt;5/hr) with a minimum oxygen saturation by pulse oximetry of 84%. The patient
had Positive Airway Pressure (PAP) titration on 19. On the therapeutic 
night, PAP was titrated to a pressure of 12 cm H2O which was effective in 
normalizing patient's breathing during sleep. The patient reports increased 
daytime alertness while using the machine on an average of 4 hours 48 minutes 
during sleep at home (27% of the nights for &gt;4 hours). The patient tolerates 
the PAP mask well. No side effects of PAP treatment are reported.



Impression: The patient is benefiting from PAP treatment and needs to continue 
regular PAP use.



Diagnosis:    Obstructive Sleep Apnea Syndrome  clinically well controlled by
CPAP treatment  G47.33



Recommendations and Patient Education:

The condition of Obstructive Sleep Apnea was discussed with the patient and the 
possible consequences of untreated sleep apnea regarding quality of sleep, 
daytime alertness, and cardiovascular complications were underlined. All of 
patients questions were welcomed and thoroughly answered.

The patient was encouraged to continue regular PAP treatment at home at 12 cm 
H2O.

Additional time was spent discussing sleep hygiene including: regular bedtime 
and wake-up times; enough sleep hours; going to bed only when sleepy; using bed 
for the sole purpose of sleeping; avoidanceof: 1) caffeinated and alcoholic 
beverages, 2) strenuous cognitive activity, or 3) heavy meals in the evening. 
The patient reported efforts to implement these sleep hygiene measures at home 
and had some additional questions which were answered in detail.

A follow up visit as needed was also recommended.

The patient was instructed to contact us in case of any further questions, 
concerns, problems, or side effects of PAP treatment.

Electronically signed by Bekah Dennis MD at 2020 10:34 AM CST
documented in this encounter



Plan of Treatment







             Date         Type         Specialty    Care Team    Description

 

                2021      Office Visit    Cardiology      Bobbi Orr M D



                                        



                                                                [address] [facility]



                                        



                                                                [address]



                                        



                                                                [place], 

[address] [phone] (Fax) 

 

                2021      Office Visit    Pulmonary Disease Emilie Siddiqi, 





                                        



                                                                [address] 

[address] [phone] (Fax) 









                Health Maintenance Due Date        Last Done       Comments

 

                HEPATITIS C (HCV) SCREEN 1955                      

 

                DTaP,Tdap,and Td Vaccines (1 - 1975                      



                Tdap)                                           

 

                COLON CANCER SCREENING ANNUAL 2006                      



                FIT/FOBT                                        

 

                COLON CANCER SCREENING FIT DNA 2006                      



                EVERY 3 YEARS                                   

 

                COLON CANCER SCREENING 2006                      



                SIGMOIDOSCOPY EVERY 5 YEARS                                 

 

                COLONOSCOPY     2006                      

 

                Colorectal Cancer Screening 2006                      

 

                Zoster Recombinant Vaccine 2006                      



                (SHINGRIX) (1 of 2)                                 

 

                LUNG CANCER SCREEN: Recommended 2011                      



                for age 55-80 with 30 + pack year                               

  



                history                                         

 

                INFLUENZA VACCINE (#1) 2020                      

 

                Depression Screening 2021      

 

                PNEUMOCOCCAL 0-64 YEARS COMBINED Aged Out                       

 No longer eligible based on



                SERIES                                          patient's age to

 complete



                                                                this topic



documented as of this encounter



Implants







          Implanted Type      Area       Device Identifier Shelf Exp

iration Model / 

Serial



                                                            Date      / Lot

 

          Plate     PLATE     Arm                                     



documented as of this encounter



Results

Not on filedocumented in this encounter



Visit Diagnoses







                                        Diagnosis

 

                                        Obstructive sleep apnea on CPAP - Primar

y







                                        Obstructive sleep apnea (adult) (pediatr

ic)



documented in this encounter



Insurance







          Payer     Benefit Plan Subscriber ID Effective Dates Phone     Address

   Type



                    / Group                                           

 

          [employer] OF   [facility] [id number]-Radha [phone] [address]

[address]



           [place]                            [id number]



                                        



                                                            [place], [place] 



                                                            [address]     









           Guarantor Name Account Type Relation to Date of    Phone      Billing



                                 Patient    Birth                 Address

 

           Shekhar Griffin Personal/Family Self       1955 [phone] 

[address]



                                                       (Home)     [place] [address]



documented as of this encounter

## 2021-01-29 NOTE — XMS REPORT
Continuity of Care Document

                           Created on:2021



Patient:ISAURA POP

Sex:Male

:1955

External Reference #:[id number]





Demographics







                          Address                   [address] [place] [id number]

 

                          Home Phone                [phone]

 

                          Phone                     [phone]

 

                          Preferred Language        en-US

 

                          Marital Status            Unknown

 

                          M[employer]on Affiliation     Unknown

 

                          Race                      Unknown

 

                          Ethnic Group              Unknown









Author







                          Organization              [facility]









Care Team Providers







                    Name                Role                Phone

 

                    [facility] Unavailable         Un

available









Problems







          Problem   Status    Onset     Classification Date      Comments  Sourc

e



                              Date                Reported            



                                                                      



                                                                      



                                                                      

 

          Benign hypertension Active              Problem   10/24/2020 Data     

 [facility]



          (disorder)                                         migrated  Medical



                                                            from [facility]



                                                            [facility] cal



                                                            on 5/30/15. Neuro



                                                                      



                                                                      

 

          Carpal tunnel syndrome Active              Problem   10/24/2020       

    [facility]



          (disorder)                                                   [facility]



                                                                      



                                                                      

 

          Hypercholesterolemia Active              Problem   10/24/2020 Data    

  [facility]



          (disorder)                                         migrated  Medical



                                                            from [facility]



                                                            [facility] cal



                                                            on 5/30/15. Neuro



                                                                      



                                                                      

 

          HYPERCHOLESTEROLEMIA Active              Condition 2015         

  



                                                                      Medical



                                                                      Group



                                                                      



                                                                      

 

          HTN       Active              Condition 2015           



                                                                      Medical



                                                                      Group



                                                                      



                                                                      







Medications







        Medication Details Route   Status  Patient Ordering Order   Source



                                        Instructions Provider Date    



                                                                



                                                                



                                                                

 

        lisinopril 20 mg See             Active                   



        oral tablet [facility]                                 018     Medical



                ns, # 30                                         Group



                tab,                                            



                Refill(s)                                         



                6, TAKE 1                                         



                TABLET BY                                         



                MOUTH                                           



                EVERY DAY,                                         



                Pharmacy:                                         



                [facility]                                         



                [facility] #[id number]                                         



                                                                



                                                                

 

        lisinopril 20 mg 1 tablet,         No Longer                  



        oral tablet PO, Daily,         Active                  018     Medical



                0                                               Group



                Refill(s)                                         



                                                                



                                                                

 

        amLODIPine 10 mg See             Active                   



        oral tablet [facility]                                 018     Medical



                ns, TAKE 1                                         Group



                TABLET BY                                         



                MOUTH                                           



                EVERY DAY,                                         



                # 90 tab,                                         



                3                                               



                Refill(s),                                         



                Pharmacy:                                         



                [facility]                                         



                [facility] #[id number]                                         



                                                                



                                                                

 

        simvastatin 40 See             Active                   



        mg oral tablet [facility]                                 018     Medica

l



                ns, TAKE 1                                         Group



                TABLET BY                                         



                MOUTH AT                                         



                BEDTIME, #                                         



                90 tab, 3                                         



                Refill(s),                                         



                Pharmacy:                                         



                [facility]                                         



                cy #[id number]                                         



                                                                



                                                                

 

        lisinopril 10 mg See             No Longer                  



        oral tablet [facility]         Active                  018     Medical



                ns, TAKE 2                                         Group



                TABLET BY                                         



                MOUTH                                           



                EVERY DAY,                                         



                # 180 tab,                                         



                3                                               



                Refill(s),                                         



                Pharmacy:                                         



                [facility]                                         



                cy #[id number]                                         



                                                                



                                                                

 

        lisinopril 10 mg See             Active                  05/10/2 



        oral tablet [facility]                                 018     Medical



                ns, TAKE 2                                         Group



                TABLET BY                                         



                MOUTH                                           



                EVERY DAY,                                         



                # 180 tab,                                         



                3                                               



                Refill(s),                                         



                Pharmacy:                                         



                [facility] #[id number]                                         



                                                                



                                                                

 

        simvastatin 40 See             Active                   [facility]



        mg oral tablet [facility]                                 018     Medica

l



                ns, # 90                                         Group



                tab,                                            



                Refill(s)                                         



                3, TAKE 1                                         



                TABLET BY                                         



                MOUTH AT                                         



                BEDTIME,                                         



                Pharmacy:                                         



                [facility] #[id number]                                         



                                                                



                                                                

 

        triamcinolone 40 mg,          Inactive                  



        ACETONIDE 40 Route: IM,                                 018     Medical



        mg/mL injectable ONCE,                                           Group



        suspension Dosing                                          



                Weight                                          



                105.057,                                         



                kg,                                             



                (KENALOG),                                         



                Start                                           



                date:                                           



                18                                         



                14:59:00                                         



                CDT, Stop                                         



                date:                                           



                18                                         



                14:59:00                                         



                CDT                                             



                                                                



                                                                

 

        benzonatate 100 100 mg = 1         No Longer                  [facility]



        mg oral capsule cap, PO,         Active                  018     Medical



                TID, do                                         Group



                not crush                                         



                or chew, X                                         



                10 day, #                                         



                30 cap, 0                                         



                Refill(s),                                         



                Pharmacy:                                         



                [facility] #[id number]                                         



                                                                



                                                                

 

        Levofloxacin 500 500 mg = 1         No Longer                  [facility]



        MG Oral Tablet tab, PO,         Active                  018     Medical



        [Levaquin] Q24H, X 7                                         Group



                day, # 7                                         



                tab, 0                                          



                Refill(s),                                         



                Pharmacy:                                         



                [facility] #[id number]                                         



                                                                



                                                                

 

        sucralfate 1 g 1 gm = 1         Active                   



        oral tablet tab, PO,                                 017     Medical



                Before                                          Group



                Meals &                                         



                Bedtime, #                                         



                120 tab, 1                                         



                Refill(s),                                         



                Pharmacy:                                         



                [facility] #[id number]                                         



                                                                



                                                                

 

        omeprazole 20 mg 20 mg = 1         Active                   



        oral enteric tab, PO,                                 017     Medical



        coated tablet QAM, # 30                                         Group



                tab, 2                                          



                Refill(s),                                         



                Pharmacy:                                         



                [facility] #[id number]                                         



                                                                



                                                                

 

        Ascorbic Acid 1 tab, PO,         Active                   



        113 MG / Beta Daily, 0                                 017     Medical



        Carotene 7160 MG Refill(s)                                         Group



        / cuprous oxide                                                 



        0.4 MG /                                                 



        dl-alpha                                                 



        tocopheryl                                                 



        acetate 100 UNT                                                 



        / Zinc Oxide                                                 



        17.4 MG Oral                                                 



        Tablet                                                  



        [PreserVision]                                                 



                                                                



                                                                

 

        AMLODIPINE 1 po qam         Active                   



        BESYLATE 5 MG                                         015     Medical



        TABS                                                    Group



                                                                



                                                                

 

        LISINOPRIL-HYDRO 1 po qd         Active                   



        CHLOROTHIAZIDE                                         013     Medical



        10-12.5 MG TABS                                                 Group



                                                                



                                                                

 

        LISINOPRIL-HYDRO 1 po qd         Active                   



        CHLOROTHIAZIDE                                         013     Medical



        10-12.5 MG TABS                                                 Group



                                                                



                                                                

 

        SIMVASTATIN 20 1 po qd         Active                   [facility]



        MG TABS                                         012     Medical



                                                                Group



                                                                



                                                                

 

        SIMVASTATIN 20 1 po qd         Active                   [facility]



        MG TABS                                         012     Medical



                                                                Group



                                                                



                                                                







Allergies, Adverse Reactions, Alerts







        Substance Category Reaction Severity Reaction Status  Date    Comments S

ource



                                        type            Reported         



                                                                        



                                                                        



                                                                        

 

        PCN     Drug                    PCN                      [facility]



                allergy                                 3               Medical



                                                                        Group



                                                                        



                                                                        

 

        penicillins Assertion                 Drug    Active   Data    

[facility]



        <sup>1</sup                         allergy         3       migrated [facility]

ro



        >                                                       from [employer] 



                                                                on 7/30/15. 



                                                                Originally 



                                                                documented 



                                                                as PCN. 



                                                                        



                                                                        







Immunizations







                                        No Data Provided for This Section







Results







        Order Name Results Value   Reference Date    Interpretation Comments Elin

rce



                                Range                           



                                                                



                                                                

 

        Chemistry SODIUM  138     135 - 143                   



                                                            Medical



                                                                Group



                                                                



                                                                

 

        Chemistry POTASSIUM 4.4     3.3 - 5.0                   



                                                            Medical



                                                                Group



                                                                



                                                                

 

        Chemistry ALBUMIN 4.4     3.5 - 5.0                   



                                                            Medical



                                                                Group



                                                                



                                                                

 

        Chemistry CALCIUM 9.4     8.6 - 9.8                   



                                                            Medical



                                                                Group



                                                                



                                                                

 

        Chemistry CREATININE 0.82    0.46 - 1.20                   



                                                            Medical



                                                                Group



                                                                



                                                                

 

        Chemistry BUN     17      10 - 22                   



                                                            Medical



                                                                Group



                                                                



                                                                

 

        Chemistry ALK PHOS 86      32 - 96                   



                                                            Medical



                                                                Group



                                                                



                                                                

 

        Chemistry SGOT (AST) 22      10 - 42                   



                                                            Medical



                                                                Group



                                                                



                                                                

 

        Chemistry SGPT (ALT) 17      11 - 43                   



                                                            Medical



                                                                Group



                                                                



                                                                

 

        Chemistry CHOLESTEROL 228     120 - 200                   



                                                            Medical



                                                                Group



                                                                



                                                                

 

        Chemistry HDL     51      26 - 62                   



                                                            Medical



                                                                Group



                                                                



                                                                

 

        Chemistry LDL     132     0 - 130                   



                                                            Medical



                                                                Group



                                                                



                                                                

 

        Hematology HGB     14.3    12.3 - 17.3 2015                    Medical



                                                                Group



                                                                



                                                                

 

        Hematology HCT     42.9    36.7 - 50.5                   



                                                            Medical



                                                                Group



                                                                



                                                                

 

        Chemistry SODIUM  140     135 - 143 10/13/                  



                                                            Medical



                                                                Group



                                                                



                                                                

 

        Chemistry POTASSIUM 4.4     3.3 - 5.0 10/13/                  



                                                            Medical



                                                                Group



                                                                



                                                                

 

        Chemistry SODIUM  140     135 - 143 10/13/                  



                                                            Medical



                                                                Group



                                                                



                                                                

 

        Chemistry POTASSIUM 4.4     3.3 - 5.0 10/13/                  



                                                            Medical



                                                                Group



                                                                



                                                                

 

        Chemistry ALBUMIN 4.3     3.5 - 5.0 10/13/                  



                                                            Medical



                                                                Group



                                                                



                                                                

 

        Chemistry CALCIUM 9.4     8.6 - 9.8 102014                    Medical



                                                                Group



                                                                



                                                                

 

        Chemistry CREATININE 0.73    0.46 - 1.20 10/13/                  [facility]



                                        2014                    [facility]



                                                                Group



                                                                



                                                                

 

        Chemistry BUN     15      10 - 22 10/13/                  



                                                            Medical



                                                                Group



                                                                



                                                                

 

        Chemistry ALK PHOS 80      32 - 96 10/13/                  [facility]



                                        2014                    [facility]



                                                                Group



                                                                



                                                                

 

        Chemistry SGOT (AST) 27      10 - 42 10/13/                  [facility]



                                        2014                    [facility]



                                                                Group



                                                                



                                                                

 

        Chemistry SGPT (ALT) 29      11 - 43 10/13/                  [facility]



                                        2014                    [facility]



                                                                Group



                                                                



                                                                

 

        Chemistry CHOLESTEROL 259     120 - 200 10/13/                  [facility]



                                        2014                    [facility]



                                                                Group



                                                                



                                                                

 

        Chemistry HDL     49      26 - 62 10/13/                  [facility]



                                        2014                    [facility]



                                                                Group



                                                                



                                                                

 

        Chemistry LDL     145     0 - 130 10/13/                  [facility]



                                        2014                    [facility]



                                                                Group



                                                                



                                                                

 

        Chemistry PSA     1.16    0 - 4.0 10/13/                  [facility]



                                        2014                    [facility]



                                                                Group



                                                                



                                                                

 

        Chemistry TSH     1.48    0.34 - 5.60 10/13/                  [facility]



                                        2014                    [facility]



                                                                Group



                                                                



                                                                

 

        Chemistry PSA     1.31            08/15/                  [facility]



                                        2013                    [facility]



                                                                Group



                                                                



                                                                

 

        Chemistry PSA     1.31            08/15/                  [facility]



                                        2013                    [facility]



                                                                Group



                                                                



                                                                

 

        Chemistry PSA     3.02            2011                    Medical



                                                                Group



                                                                



                                                                

 

        Chemistry PSA     3.02            2011                    Medical



                                                                Group



                                                                



                                                                

 

        Chemistry PSA     1.2             2005                    Medical



                                                                Group



                                                                



                                                                

 

        Chemistry PSA     1.2                               



                                                            Medical



                                                                Group



                                                                



                                                                







Pathology Reports







                                        No Data Provided for This Section







Diagnostic Reports







                                        No Data Provided for This Section







Consultation Notes







                                        No Data Provided for This Section







Discharge Summaries







                                        No Data Provided for This Section







History and Physicals







                                        No Data Provided for This Section







Vital Signs







             Vital Sign   Value        Date         Comments     Source



                                                                 

 

             Weight       110.966      2018                 [facility]

p



                                                                 



                                                                 

 

             BMI Calculated 30.58        2018                 [facility]

oup



                                                                 



                                                                 

 

             Height       190.5 cm     2018                 [facility]

p



                                                                 



                                                                 

 

             Heart Rate   80           2018                 [facility]

p



                                                                 



                                                                 

 

             Systolic (mm Hg) 151          2018                 [facility] 

Group



                                                                 



                                                                 

 

             Diastolic (mm Hg) 96           2018                 [facility]

 Group



                                                                 



                                                                 

 

             Weight       106.989      05/10/2018                 [facility]

p



                                                                 



                                                                 

 

             BMI Calculated 29.48        05/10/2018                 [facility]

oup



                                                                 



                                                                 

 

             Heart Rate   68           05/10/2018                 [facility]

p



                                                                 



                                                                 

 

             Height       190.5 cm     05/10/2018                 [facility]

p



                                                                 



                                                                 

 

             Systolic (mm Hg) 153          05/10/2018                 [facility] 

Group



                                                                 



                                                                 

 

             Diastolic (mm Hg) 91           05/10/2018                 [facility]

 Group



                                                                 



                                                                 

 

             Weight       105.057      2018                 [facility]

p



                                                                 



                                                                 

 

             Heart Rate   67           2018                 [facility]

p



                                                                 



                                                                 

 

             Respitory Rate 16           2018                 [facility]

oup



                                                                 



                                                                 

 

             Systolic (mm Hg) 140          2018                 Medical 

Group



                                                                 



                                                                 

 

             Diastolic (mm Hg) 90           2018                 [facility]

 Group



                                                                 



                                                                 

 

             Weight       103.636      2017                 [facility]

p



                                                                 



                                                                 

 

             Heart Rate   64           2017                 [facility]

p



                                                                 



                                                                 

 

             Respitory Rate 16           2017                 [facility]

oup



                                                                 



                                                                 

 

             Systolic (mm Hg) 134          2017                 Medical 

Group



                                                                 



                                                                 

 

             Diastolic (mm Hg) 74           2017                 Medical

 Group



                                                                 



                                                                 

 

             BMI Calculated 29.07        2017                 [facility]

oup



                                                                 



                                                                 

 

             Weight       105.511      2017                 [facility]

p



                                                                 



                                                                 

 

             Systolic (mm Hg) 112          2017                 [facility] 

Group



                                                                 



                                                                 

 

             Diastolic (mm Hg) 74           2017                 [facility]

 Group



                                                                 



                                                                 

 

             Height       190.5 cm     2017                 [facility]

p



                                                                 



                                                                 

 

             Respitory Rate 14           2017                 [facility]

oup



                                                                 



                                                                 

 

             Heart Rate   76           2017                 [facility]

p



                                                                 



                                                                 

 

             Weight       106.818      11/15/2017                 [facility]

p



                                                                 



                                                                 

 

             BMI Calculated 29.43        11/15/2017                 [facility]

oup



                                                                 



                                                                 

 

             Height       190.5 cm     11/15/2017                 [facility]

p



                                                                 



                                                                 

 

             Systolic (mm Hg) 138          11/15/2017                 [facility] 

Group



                                                                 



                                                                 

 

             Diastolic (mm Hg) 86           11/15/2017                 Medical

 Group



                                                                 



                                                                 

 

             Heart Rate   59           11/15/2017                 [facility]

p



                                                                 



                                                                 

 

             Weight       216          2015                 [facility]

p



                                                                 



                                                                 

 

             Temperature Oral (F) 97 F         2015                 [facility]

[facility]



                                                                 



                                                                 

 

             Heart Rate   64           2015                 [facility]

p



                                                                 



                                                                 

 

             Systolic (mm Hg) 160          2015                 [facility] 

Group



                                                                 



                                                                 

 

             Diastolic (mm Hg) 94           2015                 [facility]

 [facility]



                                                                 



                                                                 

 

             Weight       228          10/14/2014                 [facility]

p



                                                                 



                                                                 

 

             Temperature Oral (F) 97 F         10/14/2014                 [facility]

[facility]



                                                                 



                                                                 

 

             Heart Rate   57           10/14/2014                 [facility]

p



                                                                 



                                                                 

 

             Systolic (mm Hg) 166          10/14/2014                 [facility] 

Group



                                                                 



                                                                 

 

             Diastolic (mm Hg) 89           10/14/2014                 [facility]

 Group



                                                                 



                                                                 

 

             Weight       231          2014                 [facility]

p



                                                                 



                                                                 

 

             Temperature Oral (F) 98 F         2014                 [facility]

[facility]



                                                                 



                                                                 

 

             Heart Rate   72           2014                 [facility]

p



                                                                 



                                                                 

 

             Systolic (mm Hg) 143          2014                 [facility] 

Group



                                                                 



                                                                 

 

             Diastolic (mm Hg) 87           2014                 [facility]

 Group



                                                                 



                                                                 

 

             Weight       233          2013                 [facility]

p



                                                                 



                                                                 

 

             Heart Rate   88           2013                 [facility]

p



                                                                 



                                                                 

 

             Systolic (mm Hg) 130          2013                 [facility] 

[facility]



                                                                 



                                                                 

 

             Diastolic (mm Hg) 84           2013                 [facility]

 [facility]



                                                                 



                                                                 

 

             Weight       220          2013                 [facility]

p



                                                                 



                                                                 

 

             Heart Rate   88           2013                 [facility]

p



                                                                 



                                                                 

 

             Systolic (mm Hg) 120          2013                 [facility] 

Group



                                                                 



                                                                 

 

             Diastolic (mm Hg) 80           2013                 [facility]

 Group



                                                                 



                                                                 

 

             Height       74           2012                 [facility]

p



                                                                 



                                                                 

 

             Weight       230          2012                 [facility]

p



                                                                 



                                                                 

 

             Heart Rate   80           2012                 [facility]

p



                                                                 



                                                                 

 

             Systolic (mm Hg) 150          2012                 [facility] 

Group



                                                                 



                                                                 

 

             Diastolic (mm Hg) 78           2012                 Medical

 Group



                                                                 



                                                                 







Encounters







       Location Location Encounter Encounter Reason Attending ADM    DC     Stat

us Source



              Details Type   Number For    Provider Date   Date          



                                   Visit                              



                                                                      



                                                                      



                                                                      

 

       [facility]        Lab Report [id number]        Geoff   10/13  10/13     

    



       [facility]               [address]          [place],          [place]

robert Pires MD                          Group



       Internal                                                         



       Med                                                            



                                                                      



                                                                      

 

       [facility]        Office [id number]        Geoff   10/14  10/14         





       [facility]        Visit  [address]          [place],          [place]

robert Pires MD                          Group



       Internal                                                         



       Med                                                            



                                                                      



                                                                      

 

       [facility]        Lab Report [id number]        Geoff        

    



       [facility]               [address]          [place],          Me

robert Pires MD                          Group



       Internal                                                         



       Med                                                            



                                                                      



                                                                      

 

       [facility]        Office [id number]        Geoff            





       [facility]        Visit  [address]          [place],          Me

robert Cochran



       Internal                                                         



       Med                                                            



                                                                      



                                                                      

 

                     Outpatient [id number]                 [facility]



                                          [place]                 [place]



                                                                      



                                                                      



                                                                      



                                                                      

 

                     [facility]patient [id number]                 [facility]



                                          [facility]                 [place]



                                                                      



                                                                      



                                                                      



                                                                      

 

                     Outpatient [id number]                 [facility] 

[facility]



                                          [facility]                 [place]



                                                                      



                                                                      



                                                                      



                                                                      

 

                     Outpatient [id number]                 [facility] 

[facility]



                                          [facility]                 [place]



                                                                      



                                                                      



                                                                      



                                                                      

 

                     Outpatient [id number]                 [facility] 

[facility]



                                          [facility]                 [place]



                                                                      



                                                                      



                                                                      



                                                                      

 

                     Outpatient [id number]        GEOFF   11/15         [facility] 

[facility]



                                          [facility]                 [place]



                                                                      



                                                                      



                                                                      



                                                                      

 

       [facility]          Outpatient [id number]        Ge[place]   11/15  11/16         





       Internal                             [place] [facility]2017         Medic

al



       Medicine                                                         Group



       [place]                                                         



                                                                      



                                                                      

 

                     Outpatient [id number]                 [facility]



                                                          [place]



                                                                      



                                                                      



                                                                      



                                                                      

 

       [facility]          Outpatient [id number]                        





       Family                                             Medical



       Medicine                                                         Group



       [place]                                                         



                                                                      



                                                                      

 

                     Outpatient [id number]                 Active 

[facility]



                                                          [place]



                                                                      



                                                                      



                                                                      



                                                                      

 

       [facility]          Outpatient [id number]                        





       Family                                             Medical



       Medicine                                                         Group



       [place]                                                         



                                                                      



                                                                      

 

                     Outpatient [id number]                 [facility]



                                                          [place]



                                                                      



                                                                      



                                                                      



                                                                      

 

       [facility]          Outpatient [id number]                        





       Family                                             Medical



       Medicine                                                         Group



       [place]                                                         



                                                                      



                                                                      

 

       [facility]          Phone  [phone]                        



       Family        Message                               Medical



       Medicine                                                         Group



       [place]                                                         



                                                                      



                                                                      

 

       [facility]          Phone  [phone]                        



       Internal        Message                               Medical



       Medicine                                                         Group



       [place]                                                         



                                                                      



                                                                      

 

                     Outpatient [id number]        GEOFF   05/10         [facility]



                                                          [place]



                                                                      



                                                                      



                                                                      



                                                                      

 

       [facility]          Outpatient [id number]        Geoff   05/10  05/11         





       Internal                             [place]         [facility]ic

al



       Medicine                                                         Group



       [place]                                                         



                                                                      



                                                                      

 

                     Outpatient [id number]                 [facility]



                                                          [facility] [id number]        [place]            





       Internal                             [place]          Medic

al



       Medicine                                                         Group



       [place]                                                         



                                                                      



                                                                      

 

       [facility]          Phone  [phone]               09/13  09/15         



       Internal        Message                               Medical



       Medicine                                                         Group



       [place]                                                         



                                                                      



                                                                      

 

                     Outpatient [id number]        Pee  10/21         [facility]                [place]



                                                                      



                                                                      



                                                                      



                                                                      

 

       [facility]           Outpatient [id number]        [place]  10/21  10/22         

[facility]



       Neurology                             [facility]           Neuro



       [place]                                                         



                                                                      



                                                                      



                                                                      







Procedures







           Procedure  Code       Date       Perfomer   Comments   Source



                                                                  



                                                                  

 

           Operation  [id number]                                    Medical



                                                                  Group,[facility]



                                                                  Neuro



                                                                  







Assessment and Plan







                                        No Data Provided for This Section







Plan of Care







                                        No Data Provided for This Section







Social History







                    Social History      Date                Source



                                                            

 

                    Social History TypeResponse 2018           [facility]

roup



                    Substance Abuse                         



                    Use: None.                              



                    Smoking Status                          



                                        Never smoker; Type: Cigars; Exposure to 

Tobacco Smoke None; Cigarette Smoking 

Last 365 Days No; Reg Smoking Cessation Counseling No1                          

 



                    entered on: 18                     



                    1pt is a non-smoker                     

 

                    Social History TypeResponse 2018          [facility]

o



                    Substance Abuse                         



                    Use: None.                              



                    Smoking Status                          



                                        Never smoker; Type: Cigars; Exposure to 

Tobacco Smoke None; Cigarette Smoking 

Last 365 Days No; Reg Smoking Cessation Counseling No1                          

 



                    entered on: 18                     



                    1pt is a non-smoker                     







Family History







                                        No Data Provided for This Section







Advance Directives







                                        No Data Provided for This Section







Functional Status







                                        No Data Provided for This Section

## 2021-01-30 NOTE — OP
Surgeon:  Lane Banuelos MD



Preoperative Diagnosis:  Left carpal tunnel syndrome.



Postoperative Diagnosis:  Left carpal tunnel syndrome.



Procedure Performed:  Left open carpal tunnel release.



Anesthesia:  General LMA.



Fluids:  Per Anesthesia record.



Estimated Blood Loss:  2 cc.



Complications:  None.



Indication For Procedure:  Shekhar is a 65-year-old male who presented to my clinic with signs, sympt
oms, and EMG findings consistent with a significant carpal tunnel syndrome.  Patient failed conservat
bg treatment measures, had significant difficulties with activities of daily living.  After discussi
on with the patient at length risks and benefits associated with operative and nonoperative treatment
, he expressed understanding and elected to proceed with operative treatment.



Description Of Procedure:  After informed consent was obtained, the patient was identified in the pre
operative holding area.  The left upper extremity was marked.  The patient was then taken to the OR, 
transferred to the operative table in the supine fashion and placed under general LMA anesthesia.  Th
e left upper extremity was then prepped and draped in usual sterile fashion.  A time-out was initiate
d.  The correct patient and procedure were confirmed and identified.  The patient did receive his pre
operative prophylactic antibiotics and the left upper extremity was then exsanguinated and tourniquet
 was inflated to 250 mmHg.  Approximately, a 3 cm longitudinal incision was made just ulnar to the th
enar crease.  Dissection was then taken down to the palmar fascia.  A [place] elevator was then placed 
deep to the palmar fascia and transverse carpal ligament and with a 15 blade, transverse carpal ligam
ent was released to decompress median nerve with a [place] elevator protecting the median nerve at all 
times just deep to the ligament.  Using a blunt-tipped Metzenbaum, any remaining fascial bands were t
hen released again with the tips aimed to superficially.  The wound was then irrigated thoroughly wit
h normal saline and skin was approximated using a 5-0 Prolene.  Sterile dressings were applied.  Tour
niquet was let down.  The patient was awakened and transferred to PACU in stable condition.



Postoperative Plan:  He will follow up in 1 week for wound check and suture removal.  He may begin wo
rking on range of motion exercises.





CV/MODL

DD:  01/29/2021 21:45:27Voice ID:  [id number]

DT:  01/30/2021 01:45:33Report ID:  [id number]

## 2021-03-18 LAB
BUN BLD-MCNC: 31 MG/DL (ref 7–18)
GLUCOSE SERPLBLD-MCNC: 101 MG/DL (ref 74–106)
HCT VFR BLD CALC: 39.5 % (ref 39.6–49)
INR BLD: 1.03
LYMPHOCYTES # SPEC AUTO: 0.9 K/UL (ref 0.7–4.9)
PMV BLD: 7.8 FL (ref 7.6–11.3)
POTASSIUM SERPL-SCNC: 4.2 MMOL/L (ref 3.5–5.1)
RBC # BLD: 4.33 M/UL (ref 4.33–5.43)

## 2021-03-24 ENCOUNTER — HOSPITAL ENCOUNTER (OUTPATIENT)
Dept: HOSPITAL 97 - OR | Age: 66
Discharge: HOME HEALTH | End: 2021-03-24
Attending: ORTHOPAEDIC SURGERY
Payer: COMMERCIAL

## 2021-03-24 VITALS — TEMPERATURE: 97.4 F | DIASTOLIC BLOOD PRESSURE: 92 MMHG | OXYGEN SATURATION: 100 % | SYSTOLIC BLOOD PRESSURE: 118 MMHG

## 2021-03-24 DIAGNOSIS — I10: ICD-10-CM

## 2021-03-24 DIAGNOSIS — G56.02: Primary | ICD-10-CM

## 2021-03-24 DIAGNOSIS — Z20.822: ICD-10-CM

## 2021-03-24 DIAGNOSIS — G56.01: ICD-10-CM

## 2021-03-24 DIAGNOSIS — E78.00: ICD-10-CM

## 2021-03-24 PROCEDURE — 80048 BASIC METABOLIC PNL TOTAL CA: CPT

## 2021-03-24 PROCEDURE — 85730 THROMBOPLASTIN TIME PARTIAL: CPT

## 2021-03-24 PROCEDURE — 01N50ZZ RELEASE MEDIAN NERVE, OPEN APPROACH: ICD-10-PCS

## 2021-03-24 PROCEDURE — 36415 COLL VENOUS BLD VENIPUNCTURE: CPT

## 2021-03-24 PROCEDURE — 85610 PROTHROMBIN TIME: CPT

## 2021-03-24 PROCEDURE — 64721 CARPAL TUNNEL SURGERY: CPT

## 2021-03-24 PROCEDURE — 85025 COMPLETE CBC W/AUTO DIFF WBC: CPT

## 2021-03-24 RX ADMIN — BUPIVACAINE HYDROCHLORIDE ONE ML: 2.5 INJECTION, SOLUTION EPIDURAL; INFILTRATION; INTRACAUDAL at 11:51

## 2021-03-24 RX ADMIN — BUPIVACAINE HYDROCHLORIDE ONE ML: 2.5 INJECTION, SOLUTION EPIDURAL; INFILTRATION; INTRACAUDAL at 12:04

## 2021-03-24 NOTE — P.BOP
Preoperative diagnosis: right carpal tunnel syndrome


Postoperative diagnosis: same


Primary procedure: right open carpal tunnel release


Assistant: NONE,NONE


Estimated blood loss: 3 cc


Specimen: none


Findings: see dictation


Anesthesia: General


Complications: None


Implants: none


Fluids & blood products: per anesthesia record


Transferred to: Recovery Room


Condition: Good

## 2021-03-25 NOTE — OP
Date of Procedure:  03/24/2021



Surgeon:  Lane Banuelos MD



Preoperative Diagnosis:  Right carpal tunnel syndrome.



Postoperative Diagnosis:  Right carpal tunnel syndrome.



Procedure Performed:  Right open carpal tunnel release.



Anesthesia:  [place] block.



Complications:  None.



Implants:  None.



Indication For Procedure:  Shekhar is a 65-year-old male who presented to my clinic with signs, sympt
oms, and EMG findings consistent with carpal tunnel syndrome.  The patient failed conservative treatm
ent measures. Had continued symptoms that interfered with his activities of daily living.  I discusse
d with the patient at length risks and benefits associated with operative and nonoperative treatment.
  He expressed understanding and elected to proceed with operative treatment.



Description Of Procedure:  After informed consent was obtained, the patient was identified in the pre
operative holding area.  The right upper extremity was marked.  The patient was then brought back to 
the operating room, transferred to the operating table in supine fashion, placed under [place] block ane
sthesia.  The right upper extremity was then prepped and draped in usual sterile fashion.  A time-out
 was initiated.  The correct patient and procedure were confirmed and identified.  The patient did re
ceive his preoperative prophylactic antibiotics.  Approximately, a 2.5 cm longitudinal incision was m
abhishek just ulnar to the thenar crease.  Dissection was then taken down to the palmar fascia, which was 
identified.  A [place] elevator was placed just deep to the palmar fascia and transverse carpal ligamen
t protecting the median nerve at all times.  A 15 blade was then used to release the transverse carpa
l ligament under direct visualization.  Blunt-tip Metzenbaums were then used to release any remaining
 fascial bands again with keeping the [place] elevator protecting the median nerve at all times.  The w
ound was then irrigated thoroughly with normal saline and the skin was approximated with a 5-0 Prolen
e.  Sterile dressings were applied.  Tourniquet was let down.  The patient was awakened and transferr
ed to PACU in stable condition.



Postoperative Plan:  The patient will be nonweightbearing on his right upper extremity.  He will foll
ow up in clinic in 1 week for suture removal.



Fluids:  Per Anesthesia record.



Ebl:  2 cc.





CV/MODL

DD:  03/25/2021 09:02:05Voice ID:  [id number]

DT:  03/25/2021 12:00:26Report ID:  [id number]